# Patient Record
Sex: FEMALE | Race: WHITE | NOT HISPANIC OR LATINO | Employment: PART TIME | URBAN - METROPOLITAN AREA
[De-identification: names, ages, dates, MRNs, and addresses within clinical notes are randomized per-mention and may not be internally consistent; named-entity substitution may affect disease eponyms.]

---

## 2017-05-30 ENCOUNTER — TRANSCRIBE ORDERS (OUTPATIENT)
Dept: ADMINISTRATIVE | Facility: HOSPITAL | Age: 37
End: 2017-05-30

## 2017-05-30 DIAGNOSIS — N83.209 CYST OF OVARY, UNSPECIFIED LATERALITY: Primary | ICD-10-CM

## 2017-06-01 ENCOUNTER — HOSPITAL ENCOUNTER (OUTPATIENT)
Dept: RADIOLOGY | Facility: HOSPITAL | Age: 37
Discharge: HOME | End: 2017-06-01
Attending: OBSTETRICS & GYNECOLOGY
Payer: COMMERCIAL

## 2017-06-01 DIAGNOSIS — N83.209 CYST OF OVARY, UNSPECIFIED LATERALITY: ICD-10-CM

## 2017-06-01 PROCEDURE — 76856 US EXAM PELVIC COMPLETE: CPT

## 2017-06-01 PROCEDURE — 76830 TRANSVAGINAL US NON-OB: CPT

## 2017-07-24 ENCOUNTER — GENERIC CONVERSION - ENCOUNTER (OUTPATIENT)
Dept: OTHER | Facility: OTHER | Age: 37
End: 2017-07-24

## 2018-01-11 NOTE — RESULT NOTES
Message   I discussed results of chest x ray with patient     Verified Results  * XR CHEST PA & LATERAL 22Apr2016 01:23PM Marvin Cross Order Number: [id number]   Performing Comments: Copy to Dr Ibarra Denae Name Result Flag Reference   XR CHEST PA & LATERAL (Report)     CHEST - DUAL ENERGY     INDICATION: Cough  COMPARISON: None     VIEWS: PA (including soft tissue/bone algorithms) and lateral projections; 4 images     FINDINGS:        Cardiomediastinal silhouette appears unremarkable  The lungs are clear  No pneumothorax or pleural effusion  Visualized osseous structures appear within normal limits for the patient's age  IMPRESSION:     No active pulmonary disease            Workstation performed: [id number]     Signed by:   Yahaira Harmon MD   4/22/16       Plan  Bronchitis, chronic obstructive, with exacerbation    · Breo Ellipta 100-25 MCG/INH Inhalation Aerosol Powder Breath Activated;  INHALE 1 PUFFS Daily

## 2018-01-15 NOTE — MISCELLANEOUS
Provider Comments  Provider Comments:   patient did not come in for scheduled appointment  sent no show letter in the mail      Signatures   Electronically signed by :  Pamella Baires, ; Jul 24 2017 11:47AM EST                       (Author)

## 2018-11-14 ENCOUNTER — TRANSCRIBE ORDERS (OUTPATIENT)
Dept: ADMINISTRATIVE | Facility: HOSPITAL | Age: 38
End: 2018-11-14

## 2018-11-14 DIAGNOSIS — R10.32 ABDOMINAL PAIN, LEFT LOWER QUADRANT: Primary | ICD-10-CM

## 2018-11-16 ENCOUNTER — HOSPITAL ENCOUNTER (OUTPATIENT)
Dept: RADIOLOGY | Facility: HOSPITAL | Age: 38
Discharge: HOME | End: 2018-11-16
Attending: OBSTETRICS & GYNECOLOGY
Payer: COMMERCIAL

## 2018-11-16 DIAGNOSIS — R10.32 ABDOMINAL PAIN, LEFT LOWER QUADRANT: ICD-10-CM

## 2018-11-16 PROCEDURE — 76856 US EXAM PELVIC COMPLETE: CPT

## 2018-11-16 PROCEDURE — 76830 TRANSVAGINAL US NON-OB: CPT

## 2021-03-27 ENCOUNTER — HOSPITAL ENCOUNTER (EMERGENCY)
Facility: HOSPITAL | Age: 41
Discharge: HOME | End: 2021-03-27
Attending: EMERGENCY MEDICINE
Payer: COMMERCIAL

## 2021-03-27 ENCOUNTER — APPOINTMENT (EMERGENCY)
Dept: RADIOLOGY | Facility: HOSPITAL | Age: 41
End: 2021-03-27
Payer: COMMERCIAL

## 2021-03-27 VITALS
RESPIRATION RATE: 16 BRPM | DIASTOLIC BLOOD PRESSURE: 83 MMHG | BODY MASS INDEX: 34.79 KG/M2 | OXYGEN SATURATION: 100 % | HEART RATE: 76 BPM | WEIGHT: 190.2 LBS | TEMPERATURE: 98.7 F | SYSTOLIC BLOOD PRESSURE: 148 MMHG

## 2021-03-27 DIAGNOSIS — D64.9 ANEMIA: ICD-10-CM

## 2021-03-27 DIAGNOSIS — R51.9 HEADACHE: ICD-10-CM

## 2021-03-27 DIAGNOSIS — R20.2 PARESTHESIAS: Primary | ICD-10-CM

## 2021-03-27 DIAGNOSIS — R91.8 PULMONARY NODULES: ICD-10-CM

## 2021-03-27 LAB
ALBUMIN SERPL BCP-MCNC: 3.8 G/DL (ref 3.5–5)
ALP SERPL-CCNC: 57 U/L (ref 46–116)
ALT SERPL W P-5'-P-CCNC: 26 U/L (ref 12–78)
AMPHETAMINES SERPL QL SCN: NEGATIVE
ANION GAP SERPL CALCULATED.3IONS-SCNC: 10 MMOL/L (ref 4–13)
APTT PPP: 31 SECONDS (ref 23–37)
AST SERPL W P-5'-P-CCNC: 15 U/L (ref 5–45)
BACTERIA UR QL AUTO: NORMAL /HPF
BARBITURATES UR QL: NEGATIVE
BASOPHILS # BLD AUTO: 0.06 THOUSANDS/ΜL (ref 0–0.1)
BASOPHILS NFR BLD AUTO: 1 % (ref 0–1)
BENZODIAZ UR QL: NEGATIVE
BILIRUB SERPL-MCNC: 0.2 MG/DL (ref 0.2–1)
BILIRUB UR QL STRIP: NEGATIVE
BUN SERPL-MCNC: 5 MG/DL (ref 5–25)
CALCIUM SERPL-MCNC: 8.7 MG/DL (ref 8.3–10.1)
CHLORIDE SERPL-SCNC: 104 MMOL/L (ref 100–108)
CLARITY UR: CLEAR
CO2 SERPL-SCNC: 25 MMOL/L (ref 21–32)
COCAINE UR QL: NEGATIVE
COLOR UR: ABNORMAL
CREAT SERPL-MCNC: 0.68 MG/DL (ref 0.6–1.3)
EOSINOPHIL # BLD AUTO: 0.18 THOUSAND/ΜL (ref 0–0.61)
EOSINOPHIL NFR BLD AUTO: 2 % (ref 0–6)
ERYTHROCYTE [DISTWIDTH] IN BLOOD BY AUTOMATED COUNT: 11.9 % (ref 11.6–15.1)
EXT PREG TEST URINE: NORMAL
EXT. CONTROL ED NAV: NORMAL
GFR SERPL CREATININE-BSD FRML MDRD: 109 ML/MIN/1.73SQ M
GLUCOSE SERPL-MCNC: 94 MG/DL (ref 65–140)
GLUCOSE UR STRIP-MCNC: NEGATIVE MG/DL
HCT VFR BLD AUTO: 34.3 % (ref 34.8–46.1)
HGB BLD-MCNC: 10.9 G/DL (ref 11.5–15.4)
HGB UR QL STRIP.AUTO: ABNORMAL
IMM GRANULOCYTES # BLD AUTO: 0.02 THOUSAND/UL (ref 0–0.2)
IMM GRANULOCYTES NFR BLD AUTO: 0 % (ref 0–2)
INR PPP: 0.95 (ref 0.84–1.19)
KETONES UR STRIP-MCNC: NEGATIVE MG/DL
LEUKOCYTE ESTERASE UR QL STRIP: NEGATIVE
LYMPHOCYTES # BLD AUTO: 1.55 THOUSANDS/ΜL (ref 0.6–4.47)
LYMPHOCYTES NFR BLD AUTO: 21 % (ref 14–44)
MAGNESIUM SERPL-MCNC: 2.3 MG/DL (ref 1.6–2.6)
MCH RBC QN AUTO: 30.8 PG (ref 26.8–34.3)
MCHC RBC AUTO-ENTMCNC: 31.8 G/DL (ref 31.4–37.4)
MCV RBC AUTO: 97 FL (ref 82–98)
METHADONE UR QL: NEGATIVE
MONOCYTES # BLD AUTO: 0.39 THOUSAND/ΜL (ref 0.17–1.22)
MONOCYTES NFR BLD AUTO: 5 % (ref 4–12)
NEUTROPHILS # BLD AUTO: 5.28 THOUSANDS/ΜL (ref 1.85–7.62)
NEUTS SEG NFR BLD AUTO: 71 % (ref 43–75)
NITRITE UR QL STRIP: NEGATIVE
NON-SQ EPI CELLS URNS QL MICRO: NORMAL /HPF
NRBC BLD AUTO-RTO: 0 /100 WBCS
OPIATES UR QL SCN: NEGATIVE
OXYCODONE+OXYMORPHONE UR QL SCN: NEGATIVE
PCP UR QL: NEGATIVE
PH UR STRIP.AUTO: 7.5 [PH]
PLATELET # BLD AUTO: 297 THOUSANDS/UL (ref 149–390)
PMV BLD AUTO: 8.9 FL (ref 8.9–12.7)
POTASSIUM SERPL-SCNC: 3.9 MMOL/L (ref 3.5–5.3)
PROT SERPL-MCNC: 7.9 G/DL (ref 6.4–8.2)
PROT UR STRIP-MCNC: NEGATIVE MG/DL
PROTHROMBIN TIME: 12.6 SECONDS (ref 11.6–14.5)
RBC # BLD AUTO: 3.54 MILLION/UL (ref 3.81–5.12)
RBC #/AREA URNS AUTO: NORMAL /HPF
SODIUM SERPL-SCNC: 139 MMOL/L (ref 136–145)
SP GR UR STRIP.AUTO: 1.01 (ref 1–1.03)
THC UR QL: NEGATIVE
UROBILINOGEN UR QL STRIP.AUTO: 0.2 E.U./DL
WBC # BLD AUTO: 7.48 THOUSAND/UL (ref 4.31–10.16)
WBC #/AREA URNS AUTO: NORMAL /HPF

## 2021-03-27 PROCEDURE — 99284 EMERGENCY DEPT VISIT MOD MDM: CPT

## 2021-03-27 PROCEDURE — 96374 THER/PROPH/DIAG INJ IV PUSH: CPT

## 2021-03-27 PROCEDURE — 85025 COMPLETE CBC W/AUTO DIFF WBC: CPT | Performed by: EMERGENCY MEDICINE

## 2021-03-27 PROCEDURE — 36415 COLL VENOUS BLD VENIPUNCTURE: CPT | Performed by: EMERGENCY MEDICINE

## 2021-03-27 PROCEDURE — 81001 URINALYSIS AUTO W/SCOPE: CPT | Performed by: EMERGENCY MEDICINE

## 2021-03-27 PROCEDURE — 85730 THROMBOPLASTIN TIME PARTIAL: CPT | Performed by: EMERGENCY MEDICINE

## 2021-03-27 PROCEDURE — 85610 PROTHROMBIN TIME: CPT | Performed by: EMERGENCY MEDICINE

## 2021-03-27 PROCEDURE — 70496 CT ANGIOGRAPHY HEAD: CPT

## 2021-03-27 PROCEDURE — G1004 CDSM NDSC: HCPCS

## 2021-03-27 PROCEDURE — 80307 DRUG TEST PRSMV CHEM ANLYZR: CPT | Performed by: EMERGENCY MEDICINE

## 2021-03-27 PROCEDURE — 70498 CT ANGIOGRAPHY NECK: CPT

## 2021-03-27 PROCEDURE — 81025 URINE PREGNANCY TEST: CPT | Performed by: EMERGENCY MEDICINE

## 2021-03-27 PROCEDURE — 80053 COMPREHEN METABOLIC PANEL: CPT | Performed by: EMERGENCY MEDICINE

## 2021-03-27 PROCEDURE — 99284 EMERGENCY DEPT VISIT MOD MDM: CPT | Performed by: EMERGENCY MEDICINE

## 2021-03-27 PROCEDURE — 83735 ASSAY OF MAGNESIUM: CPT | Performed by: EMERGENCY MEDICINE

## 2021-03-27 RX ORDER — MEGESTROL ACETATE 40 MG/1
20 TABLET ORAL
COMMUNITY

## 2021-03-27 RX ORDER — KETOROLAC TROMETHAMINE 30 MG/ML
15 INJECTION, SOLUTION INTRAMUSCULAR; INTRAVENOUS ONCE
Status: COMPLETED | OUTPATIENT
Start: 2021-03-27 | End: 2021-03-27

## 2021-03-27 RX ORDER — FERROUS SULFATE 325(65) MG
325 TABLET ORAL DAILY
Qty: 30 TABLET | Refills: 0 | Status: SHIPPED | OUTPATIENT
Start: 2021-03-27 | End: 2021-04-20

## 2021-03-27 RX ORDER — DOCUSATE SODIUM 100 MG/1
100 CAPSULE, LIQUID FILLED ORAL 2 TIMES DAILY
Qty: 10 CAPSULE | Refills: 0 | Status: SHIPPED | OUTPATIENT
Start: 2021-03-27 | End: 2021-04-20

## 2021-03-27 RX ADMIN — KETOROLAC TROMETHAMINE 15 MG: 30 INJECTION, SOLUTION INTRAMUSCULAR at 14:40

## 2021-03-27 RX ADMIN — IOHEXOL 85 ML: 350 INJECTION, SOLUTION INTRAVENOUS at 13:10

## 2021-03-27 NOTE — ED PROVIDER NOTES
History  Chief Complaint   Patient presents with    Numbness     States yesterday she was lying on bed because she had a headache left side of head  States her left foot, left hand , left side of tongue were numb for about 10 minutes  Also, a black hole in her vision same time  Occured at 4 pm yesterday  No headache at present  Pt in the ER with c/o left sided paresthesias that began yesterday at approx 4p, lasted for 10mins and resolved spontaneously  Symptoms are also associated with a headache, which persists  Headache was of gradual onset and non radiating, not associated with n/v/photophobia  Pt also complained of vision changes during the episode  All neurological symptoms, except for the headache have since resolved  Pt hasn't taken any meds for pain relief  Pt has a hx of menorrhagia, with menses for approx 1mth, and she was recently started on Megace  She denies recent illness  History provided by:  Patient   used: No        Prior to Admission Medications   Prescriptions Last Dose Informant Patient Reported? Taking?    albuterol (PROVENTIL HFA,VENTOLIN HFA) 90 mcg/act inhaler Unknown at Unknown time  Yes No   Sig: Inhale 2 puffs every 6 (six) hours as needed for wheezing   megestrol (MEGACE) 40 mg tablet 3/26/2021 at Unknown time Self Yes Yes   Sig: Take 40 mg by mouth 2 (two) times a day States started on 3/24   sertraline (ZOLOFT) 50 mg tablet   Yes No   Sig: Take 50 mg by mouth daily      Facility-Administered Medications: None       Past Medical History:   Diagnosis Date    Ankle fracture, right     2016  - no surgery    Bronchitis     past 6 mos    Depression     Hypercholesteremia     Irregular menstrual bleeding        Past Surgical History:   Procedure Laterality Date     SECTION      LAPAROTOMY Right 10/10/2016    Procedure: LAPAROTOMY EXPLORATORY,  OVARIAN CYSTECTOMY, OOPHORECTOMY;  Surgeon: Grey Way MD;  Location: [address] [place] [address];  Service:    Parul Alba TUBAL LIGATION      WISDOM TOOTH EXTRACTION         History reviewed  No pertinent family history  I have reviewed and agree with the history as documented  E-Cigarette/Vaping    E-Cigarette Use Never User      E-Cigarette/Vaping Substances     Social History     Tobacco Use    Smoking status: Current Every Day Smoker     Packs/day: 1 00     Years: 20 00     Pack years: 20 00     Types: Cigarettes    Smokeless tobacco: Never Used   Substance Use Topics    Alcohol use: No    Drug use: No       Review of Systems   Constitutional: Negative for chills and fever  Respiratory: Negative for cough, chest tightness and shortness of breath  Gastrointestinal: Negative for abdominal pain, diarrhea, nausea and vomiting  Genitourinary: Negative for dysuria, frequency, hematuria and urgency  Musculoskeletal: Negative for back pain, neck pain and neck stiffness  Skin: Negative for color change, pallor, rash and wound  Neurological: Positive for headaches  Negative for seizures, weakness and light-headedness  Psychiatric/Behavioral: Negative for agitation and confusion  All other systems reviewed and are negative  Physical Exam  Physical Exam  Vitals signs and nursing note reviewed  Constitutional:       General: She is not in acute distress  Appearance: She is well-developed  She is not diaphoretic  HENT:      Head: Normocephalic and atraumatic  Eyes:      Conjunctiva/sclera: Conjunctivae normal       Pupils: Pupils are equal, round, and reactive to light  Neck:      Musculoskeletal: Normal range of motion and neck supple  Cardiovascular:      Rate and Rhythm: Normal rate and regular rhythm  Heart sounds: Normal heart sounds  No murmur  Pulmonary:      Effort: Pulmonary effort is normal  No respiratory distress  Breath sounds: Normal breath sounds  Abdominal:      General: Bowel sounds are normal  There is no distension  Palpations: Abdomen is soft        Tenderness: There is no abdominal tenderness  Musculoskeletal: Normal range of motion  General: No deformity  Skin:     General: Skin is warm and dry  Capillary Refill: Capillary refill takes less than 2 seconds  Coloration: Skin is not pale  Findings: No rash  Neurological:      General: No focal deficit present  Mental Status: She is alert and oriented to person, place, and time  Cranial Nerves: No cranial nerve deficit     Psychiatric:         Behavior: Behavior normal          Vital Signs  ED Triage Vitals   Temperature Pulse Respirations Blood Pressure SpO2   03/27/21 1112 03/27/21 1112 03/27/21 1112 03/27/21 1112 03/27/21 1112   98 7 °F (37 1 °C) 76 16 148/83 100 %      Temp Source Heart Rate Source Patient Position - Orthostatic VS BP Location FiO2 (%)   03/27/21 1112 03/27/21 1112 03/27/21 1112 03/27/21 1112 --   Tympanic Monitor Sitting Left arm       Pain Score       03/27/21 1440       6           Vitals:    03/27/21 1112   BP: 148/83   Pulse: 76   Patient Position - Orthostatic VS: Sitting         Visual Acuity      ED Medications  Medications   iohexol (OMNIPAQUE) 350 MG/ML injection (SINGLE-DOSE) 85 mL (85 mL Intravenous Given 3/27/21 1310)   ketorolac (TORADOL) injection 15 mg (15 mg Intravenous Given 3/27/21 1440)       Diagnostic Studies  Results Reviewed     Procedure Component Value Units Date/Time    POCT pregnancy, urine [[id number]]  (Normal) Resulted: 03/27/21 1221    Lab Status: Final result Updated: 03/27/21 1221     EXT PREG TEST UR (Ref: Negative) neg     Control valid    Comprehensive metabolic panel [[id number]] Collected: 03/27/21 1145    Lab Status: Final result Specimen: Blood from Arm, Left Updated: 03/27/21 1215     Sodium 139 mmol/L      Potassium 3 9 mmol/L      Chloride 104 mmol/L      CO2 25 mmol/L      ANION GAP 10 mmol/L      BUN 5 mg/dL      Creatinine 0 68 mg/dL      Glucose 94 mg/dL      Calcium 8 7 mg/dL      AST 15 U/L      ALT 26 U/L      Alkaline Phosphatase 57 U/L      Total Protein 7 9 g/dL      Albumin 3 8 g/dL      Total Bilirubin 0 20 mg/dL      eGFR 109 ml/min/1 73sq m     Narrative:      [place] guidelines for Chronic Kidney Disease (CKD):     Stage 1 with normal or high GFR (GFR > 90 mL/min/1 73 square meters)    Stage 2 Mild CKD (GFR = 60-89 mL/min/1 73 square meters)    Stage 3A Moderate CKD (GFR = 45-59 mL/min/1 73 square meters)    Stage 3B Moderate CKD (GFR = 30-44 mL/min/1 73 square meters)    Stage 4 Severe CKD (GFR = 15-29 mL/min/1 73 square meters)    Stage 5 End Stage CKD (GFR <15 mL/min/1 73 square meters)  Note: GFR calculation is accurate only with a steady state creatinine    Magnesium [[id number]]  (Normal) Collected: 03/27/21 1145    Lab Status: Final result Specimen: Blood from Arm, Left Updated: 03/27/21 1215     Magnesium 2 3 mg/dL     Urine Microscopic [[id number]]  (Normal) Collected: 03/27/21 1145    Lab Status: Final result Specimen: Urine, Clean Catch Updated: 03/27/21 1215     RBC, UA None Seen /hpf      WBC, UA 0-1 /hpf      Epithelial Cells Occasional /hpf      Bacteria, UA None Seen /hpf     Rapid drug screen, urine [[id number]]  (Normal) Collected: 03/27/21 1145    Lab Status: Final result Specimen: Urine, Clean Catch Updated: 03/27/21 1212     Amph/Meth UR Negative     Barbiturate Ur Negative     Benzodiazepine Urine Negative     Cocaine Urine Negative     Methadone Urine Negative     Opiate Urine Negative     PCP Ur Negative     THC Urine Negative     Oxycodone Urine Negative    Narrative:      FOR MEDICAL PURPOSES ONLY  IF CONFIRMATION NEEDED PLEASE CONTACT THE LAB WITHIN 5 DAYS      Drug Screen Cutoff Levels:  AMPHETAMINE/METHAMPHETAMINES  1000 ng/mL  BARBITURATES     200 ng/mL  BENZODIAZEPINES     200 ng/mL  COCAINE      300 ng/mL  METHADONE      300 ng/mL  OPIATES      300 ng/mL  PHENCYCLIDINE     25 ng/mL  THC       50 ng/mL  OXYCODONE      100 ng/mL    Protime-INR [[id number]] (Normal) Collected: 03/27/21 1145    Lab Status: Final result Specimen: Blood from Arm, Left Updated: 03/27/21 1210     Protime 12 6 seconds      INR 0 95    APTT [[id number]]  (Normal) Collected: 03/27/21 1145    Lab Status: Final result Specimen: Blood from Arm, Left Updated: 03/27/21 1210     PTT 31 seconds     UA (URINE) with reflex to Scope [[id number]]  (Abnormal) Collected: 03/27/21 1145    Lab Status: Final result Specimen: Urine, Clean Catch Updated: 03/27/21 1157     Color, UA Light Yellow     Clarity, UA Clear     Specific Gravity, UA 1 010     pH, UA 7 5     Leukocytes, UA Negative     Nitrite, UA Negative     Protein, UA Negative mg/dl      Glucose, UA Negative mg/dl      Ketones, UA Negative mg/dl      Urobilinogen, UA 0 2 E U /dl      Bilirubin, UA Negative     Blood, UA Trace-Intact    CBC and differential [[id number]]  (Abnormal) Collected: 03/27/21 1145    Lab Status: Final result Specimen: Blood from Arm, Left Updated: 03/27/21 1154     WBC 7 48 Thousand/uL      RBC 3 54 Million/uL      Hemoglobin 10 9 g/dL      Hematocrit 34 3 %      MCV 97 fL      MCH 30 8 pg      MCHC 31 8 g/dL      RDW 11 9 %      MPV 8 9 fL      Platelets 827 Thousands/uL      nRBC 0 /100 WBCs      Neutrophils Relative 71 %      Immat GRANS % 0 %      Lymphocytes Relative 21 %      Monocytes Relative 5 %      Eosinophils Relative 2 %      Basophils Relative 1 %      Neutrophils Absolute 5 28 Thousands/µL      Immature Grans Absolute 0 02 Thousand/uL      Lymphocytes Absolute 1 55 Thousands/µL      Monocytes Absolute 0 39 Thousand/µL      Eosinophils Absolute 0 18 Thousand/µL      Basophils Absolute 0 06 Thousands/µL                  CTA head and neck with and without contrast   Final Result by Bozena Hendrix MD (03/27 5990)         1  No evidence of acute vascular territorial infarction, intracranial hemorrhage or mass effect     2   No stenosis, dissection or occlusion of the carotid or vertebral arteries or major vessels of the [place]  3   Pulmonary nodules in the partially visualized upper lungs measuring up to 7 mm  Based on current Fleischner Society 2017 Guidelines on incidental pulmonary nodule, followup non-contrast CT is recommended at 6-12 months from the initial examination    and, if stable at that time, an additional followup is recommended for 18-24 months from the initial examination  Workstation performed: [id number]                    Procedures  Procedures         ED Course                             SBIRT 20yo+      Most Recent Value   SBIRT (22 yo +)   In order to provide better care to our patients, we are screening all of our patients for alcohol and drug use  Would it be okay to ask you these screening questions? Yes Filed at: 03/27/2021 1256   Initial Alcohol Screen: US AUDIT-C    1  How often do you have a drink containing alcohol? 2 Filed at: 03/27/2021 1256   2  How many drinks containing alcohol do you have on a typical day you are drinking? 1 Filed at: 03/27/2021 1256   3a  Male UNDER 65: How often do you have five or more drinks on one occasion? 0 Filed at: 03/27/2021 1256   3b  FEMALE Any Age, or MALE 65+: How often do you have 4 or more drinks on one occassion? 0 Filed at: 03/27/2021 1256   Audit-C Score  3 Filed at: 03/27/2021 1256   FADY: How many times in the past year have you    Used an illegal drug or used a prescription medication for non-medical reasons? Never Filed at: 03/27/2021 1256                    MDM  Number of Diagnoses or Management Options  Anemia: new and requires workup  Headache: new and requires workup  Paresthesias: new and requires workup  Pulmonary nodules: new and requires workup  Diagnosis management comments: Pt in the ER with c/o paresthesias, now resolved and a headache  Labs reviewed - +anemia, likely secondary to menorrhagia  CTA reviewed  Neg for acute intracranial pathology  +pulmonary nodules, which I reviewed with patient  Headache resolved with Toradol  Pt will f/u with PCP/Pulm/Neurology  She will return to the ER for further concerns  Amount and/or Complexity of Data Reviewed  Clinical lab tests: ordered and reviewed  Tests in the radiology section of CPT®: ordered and reviewed    Risk of Complications, Morbidity, and/or Mortality  Presenting problems: high  Diagnostic procedures: high  Management options: high    Patient Progress  Patient progress: improved      Disposition  Final diagnoses:   Paresthesias   Pulmonary nodules   Headache   Anemia     Time reflects when diagnosis was documented in both MDM as applicable and the Disposition within this note     Time User Action Codes Description Comment    3/27/2021  2:01 PM Elias Cramp Add [R20 2] Paresthesias     3/27/2021  2:02 PM Elias Cramp O Add [R91 8] Pulmonary nodules     3/27/2021  2:03 PM Elias Cramp Add [R51 9] Headache     3/27/2021  2:08 PM Elias Cramp Add [D64 9] Anemia       ED Disposition     ED Disposition Condition Date/Time Comment    Discharge Stable Sat Mar 27, 2021  2:01 PM Adolfo Mckeon discharge to home/self care              Follow-up Information     Follow up With Specialties Details Why Contact Info Additional Information    Omega Manrique MD Family Medicine Schedule an appointment as soon as possible for a visit in 2 days for follow up [address] [facility] at [address] [facility] [address]  [address] [facility] [address] [place] [address] Ivanna Crook MD Neurology Schedule an appointment as soon as possible for a visit in 2 days for follow up [address] [facility] Pulmonology Schedule an appointment as soon as possible for a visit  for follow up for pulmonary nodules [address] [facility] [address]  [facility] Pulmonary [address] [place], [place], [address] [phone]          Discharge Medication List as of 3/27/2021  2:09 PM      START taking these medications    Details   docusate sodium (COLACE) 100 mg capsule Take 1 capsule (100 mg total) by mouth 2 (two) times a day, Starting Sat 3/27/2021, Normal      ferrous sulfate 325 (65 Fe) mg tablet Take 1 tablet (325 mg total) by mouth daily, Starting Sat 3/27/2021, Normal         CONTINUE these medications which have NOT CHANGED    Details   megestrol (MEGACE) 40 mg tablet Take 40 mg by mouth 2 (two) times a day States started on 3/24, Historical Med      albuterol (PROVENTIL HFA,VENTOLIN HFA) 90 mcg/act inhaler Inhale 2 puffs every 6 (six) hours as needed for wheezing, Until Discontinued, Historical Med      sertraline (ZOLOFT) 50 mg tablet Take 50 mg by mouth daily, Until Discontinued, Historical Med           No discharge procedures on file      PDMP Review     None          ED Provider  Electronically Signed by           Sharon Cowart DO  03/28/21 [id number]

## 2021-03-27 NOTE — DISCHARGE INSTRUCTIONS
Return to the ER for further concerns or worsening symptoms  Follow up with your primary care physician, pulmonology and neurology in 1-2 days  Take tylenol or motrin for pain relief  Take medication as prescribed

## 2021-04-20 ENCOUNTER — OFFICE VISIT (OUTPATIENT)
Dept: PULMONOLOGY | Facility: CLINIC | Age: 41
End: 2021-04-20
Payer: COMMERCIAL

## 2021-04-20 VITALS
SYSTOLIC BLOOD PRESSURE: 140 MMHG | DIASTOLIC BLOOD PRESSURE: 80 MMHG | OXYGEN SATURATION: 99 % | TEMPERATURE: 97.8 F | BODY MASS INDEX: 34.41 KG/M2 | WEIGHT: 187 LBS | HEIGHT: 62 IN | HEART RATE: 85 BPM

## 2021-04-20 DIAGNOSIS — R91.8 PULMONARY NODULES: ICD-10-CM

## 2021-04-20 DIAGNOSIS — Z72.0 TOBACCO USE: ICD-10-CM

## 2021-04-20 DIAGNOSIS — R05.9 COUGH: Primary | ICD-10-CM

## 2021-04-20 PROCEDURE — 99205 OFFICE O/P NEW HI 60 MIN: CPT | Performed by: INTERNAL MEDICINE

## 2021-04-20 PROCEDURE — 99407 BEHAV CHNG SMOKING > 10 MIN: CPT | Performed by: INTERNAL MEDICINE

## 2021-04-20 RX ORDER — FLUTICASONE FUROATE AND VILANTEROL 200; 25 UG/1; UG/1
1 POWDER RESPIRATORY (INHALATION) DAILY
Qty: 1 INHALER | Refills: 5 | Status: SHIPPED | OUTPATIENT
Start: 2021-04-20

## 2021-04-20 RX ORDER — ALBUTEROL SULFATE 90 UG/1
2 AEROSOL, METERED RESPIRATORY (INHALATION) EVERY 6 HOURS PRN
Qty: 18 G | Refills: 5 | Status: SHIPPED | OUTPATIENT
Start: 2021-04-20

## 2021-04-20 RX ORDER — BUPROPION HYDROCHLORIDE 150 MG/1
150 TABLET ORAL DAILY
Qty: 60 TABLET | Refills: 3 | Status: SHIPPED | OUTPATIENT
Start: 2021-04-20

## 2021-04-20 NOTE — ASSESSMENT & PLAN NOTE
Jimmie Mcgee reports constant productive cough, present at all times, but worst in the mornings  This is made worse by change of seasons, and is associated with shortness of breath  There is a reasonable probability of COPD, so I am sending her for pulmonary function tests  Will also start Breo given the contribution of asthmatic like symptoms and the presence of eosinophilia on recent blood work  Rescue inhaler also provided

## 2021-04-20 NOTE — ASSESSMENT & PLAN NOTE
Rocky Barreto is contemplating quitting smoking  She is unwilling to consider Chantix due to negative side effects experienced by her   She was open to Wellbutrin  She is considering nicotine patches as well  Will start Wellbutrin  I spent about 12 minutes covering tobacco cessation with her during our consultation

## 2021-04-20 NOTE — PROGRESS NOTES
Pulmonary Consultation   Danny Meza 38 y o  female MRN: [id number]  4/20/2021      Assessment:    Tobacco use  Tyree Friend is contemplating quitting smoking  She is unwilling to consider Chantix due to negative side effects experienced by her   She was open to Wellbutrin  She is considering nicotine patches as well  Will start Wellbutrin  I spent about 12 minutes covering tobacco cessation with her during our consultation  Pulmonary nodules  There are multiple subcentimeter rounded, noncalcified nodules in no particular geographic distribution throughout her lungs  Some of these have a surrounding halo of ground-glass  The differential for these is broad, including potentially malignancy given her type B symptoms, to smoking related or inflammatory  I anticipate we will likely need to repeat a CT scan in 3 months for progression, but I am sending her outside CT down to our radiology department for evaluation  Depending on suggestions from that department, may alter the timeline discussed  Since the nodules are subcentimeter, they are likely too small to evaluate by PET or biopsy at this point  Cough  Tyree Friend reports constant productive cough, present at all times, but worst in the mornings  This is made worse by change of seasons, and is associated with shortness of breath  There is a reasonable probability of COPD, so I am sending her for pulmonary function tests  Will also start Breo given the contribution of asthmatic like symptoms and the presence of eosinophilia on recent blood work  Rescue inhaler also provided  Plan:    Diagnoses and all orders for this visit:    Cough  -     fluticasone-vilanterol (BREO ELLIPTA) 200-25 MCG/INH inhaler; Inhale 1 puff daily Rinse mouth after use  -     albuterol (Ventolin HFA) 90 mcg/act inhaler; Inhale 2 puffs every 6 (six) hours as needed for wheezing  -     Complete PFT with post bronchodilator;  Future    Tobacco use  -     buPROPion (WELLBUTRIN XL) 150 mg 24 hr tablet; Take 1 tablet (150 mg total) by mouth daily    Pulmonary nodules    No follow-ups on file  Anticipating 3 month follow-up after CT scan; however, this timeline may change pending radiology evaluation  History of Present Illness   HPI:  Atilio Rosenbaum is a 38 y o  female who presents for evaluation of an abnormal CT scan of the chest   The patient reports that she was seen in the emergency room for atypical neurologic symptoms including numbness and tingling of the left side, for which she underwent a CTA of the head and neck  This was unremarkable for vascular insufficiency or infarct, and the patient was preliminarily diagnosed with a TIA  However, the patient also had a 7 mm right upper lobe nodule noted on the CT neck, for which she underwent a dedicated CT chest at an outside medical institution, revealing multiple subcentimeter nodules, with relatively high intensity, attached to vascular structures, some of which included a surrounding ground-glass halo  They were distributed more on the right than on the left, with a slight per dual action for the right lower lobe, but the largest of these nodules is in the right upper lobe  There is no associated pathologically enlarged lymphadenopathy, although multiple station 4R lymph nodes are visible on the CT scan  This CT scan did not come with an associated radiology read  Symptomatically, the patient reports that she has off and on shortness of breath with mild exertion, described as going up a flight of stairs  She has a chronic baseline cough, worse in the mornings, productive of white sputum  This is present throughout the day as well, but less prominent  She reports her symptoms are getting worse over time  She does have a significant smoking history  She reports she was previously on a rescue inhaler which she did field significant benefit from, however she discontinued it and did not follow-up    She also reports that she is undergoing B type symptoms  She thinks that these are attributable to entering menopause  She describes night sweats which were more common a few months earlier, but she has recently been placed on Megace for dysfunctional uterine bleeding, and has noted that the night sweats have more or less abated since then, although she does indicate that she had them the day prior to presentation  She has not unintentionally lost weight  She is not having fevers  Her medical history is otherwise only significant for dysfunctional uterine bleeding  She has had no major surgeries in her past   Socially, she has smoked since age 11, about a pack and half per day  She has significant secondhand smoke exposure through her family  If family history is positive for ovarian cancer in a maternal aunt, and brain cancer in a maternal grandfather  There is no other cancer in the family  Emphysema does run in her family  She is a current everyday drinker of a 6 pack daily  Review of Systems   Constitutional: Positive for diaphoresis  Negative for fever  Respiratory: Positive for cough, shortness of breath and wheezing  Allergic/Immunologic: Positive for environmental allergies  Neurological: Positive for numbness  All other systems reviewed and are negative  Historical Information   Past Medical History:   Diagnosis Date    Ankle fracture, right     2016  - no surgery    Bronchitis     past 6 mos    Depression     Hypercholesteremia     Irregular menstrual bleeding      Past Surgical History:   Procedure Laterality Date     SECTION      LAPAROTOMY Right 10/10/2016    Procedure: LAPAROTOMY EXPLORATORY,  OVARIAN CYSTECTOMY, OOPHORECTOMY;  Surgeon: Arlys Cogan, MD;  Location: [address] [place] [address];  Service:    Paula Finn TUBAL LIGATION      WISDOM TOOTH EXTRACTION       History reviewed  No pertinent family history        Meds/Allergies     Current Outpatient Medications:     megestrol (MEGACE) 40 mg tablet, Take 20 mg by mouth States started on 3/24  Pt states today that she is taking once daily at bedtime 4/20/21, Disp: , Rfl:     albuterol (PROVENTIL HFA,VENTOLIN HFA) 90 mcg/act inhaler, Inhale 2 puffs every 6 (six) hours as needed for wheezing, Disp: , Rfl:     albuterol (Ventolin HFA) 90 mcg/act inhaler, Inhale 2 puffs every 6 (six) hours as needed for wheezing, Disp: 18 g, Rfl: 5    buPROPion (WELLBUTRIN XL) 150 mg 24 hr tablet, Take 1 tablet (150 mg total) by mouth daily, Disp: 60 tablet, Rfl: 3    fluticasone-vilanterol (BREO ELLIPTA) 200-25 MCG/INH inhaler, Inhale 1 puff daily Rinse mouth after use , Disp: 1 Inhaler, Rfl: 5    sertraline (ZOLOFT) 50 mg tablet, Take 50 mg by mouth daily, Disp: , Rfl:   No Known Allergies    Vitals: Blood pressure 140/80, pulse 85, temperature 97 8 °F (36 6 °C), temperature source Temporal, height 5' 1 5" (1 562 m), weight 84 8 kg (187 lb), SpO2 99 %, not currently breastfeeding  Body mass index is 34 76 kg/m²  Oxygen Therapy  SpO2: 99 %  Oxygen Therapy: None (Room air)      Physical Exam  Physical Exam  Vitals signs reviewed  Constitutional:       General: She is not in acute distress  Appearance: Normal appearance  She is well-developed  She is not ill-appearing  HENT:      Head: Normocephalic and atraumatic  Eyes:      General: No scleral icterus  Conjunctiva/sclera: Conjunctivae normal    Neck:      Musculoskeletal: Neck supple  Vascular: No JVD  Cardiovascular:      Rate and Rhythm: Normal rate and regular rhythm  Heart sounds: Normal heart sounds  No murmur  No friction rub  No gallop  Pulmonary:      Effort: Pulmonary effort is normal  No respiratory distress  Breath sounds: Normal breath sounds  No wheezing or rales  Musculoskeletal:      Right lower leg: No edema  Left lower leg: No edema  Skin:     General: Skin is warm and dry  Findings: No rash     Neurological:      General: No focal deficit present  Mental Status: She is alert and oriented to person, place, and time  Mental status is at baseline  Psychiatric:         Mood and Affect: Mood normal          Behavior: Behavior normal        Labs: I have personally reviewed pertinent lab results  Lab Results   Component Value Date    WBC 7 48 03/27/2021    HGB 10 9 (L) 03/27/2021    HCT 34 3 (L) 03/27/2021    MCV 97 03/27/2021     03/27/2021     Lab Results   Component Value Date    CALCIUM 8 7 03/27/2021    K 3 9 03/27/2021    CO2 25 03/27/2021     03/27/2021    BUN 5 03/27/2021    CREATININE 0 68 03/27/2021     No results found for: IGE  Lab Results   Component Value Date    ALT 26 03/27/2021    AST 15 03/27/2021    ALKPHOS 57 03/27/2021     No major abnormalities noted  Anemia attributed to dysfunctional uterine bleeding  Imaging and other studies: I have personally reviewed pertinent films in PACS  My CT interpretation as above  Pending formal Radiology read  Pulmonary function testing:   Pending  EKG, Pathology, and Other Studies: I have personally reviewed pertinent reports  [facility]

## 2021-04-20 NOTE — ASSESSMENT & PLAN NOTE
There are multiple subcentimeter rounded, noncalcified nodules in no particular geographic distribution throughout her lungs  Some of these have a surrounding halo of ground-glass  The differential for these is broad, including potentially malignancy given her type B symptoms, to smoking related or inflammatory  I anticipate we will likely need to repeat a CT scan in 3 months for progression, but I am sending her outside CT down to our radiology department for evaluation  Depending on suggestions from that department, may alter the timeline discussed  Since the nodules are subcentimeter, they are likely too small to evaluate by PET or biopsy at this point

## 2021-04-23 ENCOUNTER — TELEPHONE (OUTPATIENT)
Dept: PULMONOLOGY | Facility: HOSPITAL | Age: 41
End: 2021-04-23

## 2021-04-23 NOTE — TELEPHONE ENCOUNTER
----- Message from Zaynab Rolon MD sent at 4/23/2021 11:01 AM EDT -----  Bucky Packer - I had our radiologists review Olivia's scan we uploaded earlier in the week  Recommendation was a 3 month follow up and I placed the order  Can you call her and get her scheduled and let her know? Thanks!   Thiago Lucero

## 2021-04-26 NOTE — TELEPHONE ENCOUNTER
Informed pt to have CT chest scheduled for July  Pt stated will call central scheduling to have scheduled

## 2021-05-13 ENCOUNTER — HOSPITAL ENCOUNTER (OUTPATIENT)
Dept: PULMONOLOGY | Facility: HOSPITAL | Age: 41
Discharge: HOME | End: 2021-05-13
Attending: INTERNAL MEDICINE
Payer: COMMERCIAL

## 2021-05-13 DIAGNOSIS — R05.9 COUGH: ICD-10-CM

## 2021-05-13 PROCEDURE — 94726 PLETHYSMOGRAPHY LUNG VOLUMES: CPT | Performed by: INTERNAL MEDICINE

## 2021-05-13 PROCEDURE — 94760 N-INVAS EAR/PLS OXIMETRY 1: CPT

## 2021-05-13 PROCEDURE — 94060 EVALUATION OF WHEEZING: CPT

## 2021-05-13 PROCEDURE — 94726 PLETHYSMOGRAPHY LUNG VOLUMES: CPT

## 2021-05-13 PROCEDURE — 94060 EVALUATION OF WHEEZING: CPT | Performed by: INTERNAL MEDICINE

## 2021-05-13 PROCEDURE — 94729 DIFFUSING CAPACITY: CPT | Performed by: INTERNAL MEDICINE

## 2021-05-13 PROCEDURE — 94729 DIFFUSING CAPACITY: CPT

## 2021-05-13 RX ORDER — ALBUTEROL SULFATE 2.5 MG/3ML
2.5 SOLUTION RESPIRATORY (INHALATION) ONCE
Status: COMPLETED | OUTPATIENT
Start: 2021-05-13 | End: 2021-05-13

## 2021-05-13 RX ADMIN — ALBUTEROL SULFATE 2.5 MG: 2.5 SOLUTION RESPIRATORY (INHALATION) at 09:01

## 2021-05-19 ENCOUNTER — TELEPHONE (OUTPATIENT)
Dept: PULMONOLOGY | Facility: CLINIC | Age: 41
End: 2021-05-19

## 2021-07-02 ENCOUNTER — OCCMED (OUTPATIENT)
Dept: URGENT CARE | Facility: CLINIC | Age: 41
End: 2021-07-02

## 2021-07-02 ENCOUNTER — APPOINTMENT (OUTPATIENT)
Dept: RADIOLOGY | Facility: CLINIC | Age: 41
End: 2021-07-02
Payer: COMMERCIAL

## 2021-07-02 DIAGNOSIS — S99.922A TOE INJURY, LEFT, INITIAL ENCOUNTER: Primary | ICD-10-CM

## 2021-07-02 DIAGNOSIS — S99.922A TOE INJURY, LEFT, INITIAL ENCOUNTER: ICD-10-CM

## 2021-07-02 PROCEDURE — 73660 X-RAY EXAM OF TOE(S): CPT

## 2021-07-28 ENCOUNTER — HOSPITAL ENCOUNTER (OUTPATIENT)
Dept: RADIOLOGY | Facility: HOSPITAL | Age: 41
Discharge: HOME | End: 2021-07-28
Attending: INTERNAL MEDICINE
Payer: COMMERCIAL

## 2021-07-28 DIAGNOSIS — R91.8 PULMONARY NODULES: ICD-10-CM

## 2021-07-28 PROCEDURE — 71250 CT THORAX DX C-: CPT

## 2021-08-02 ENCOUNTER — TELEPHONE (OUTPATIENT)
Dept: PULMONOLOGY | Facility: CLINIC | Age: 41
End: 2021-08-02

## 2021-08-02 DIAGNOSIS — R91.8 PULMONARY NODULES: Primary | ICD-10-CM

## 2021-08-02 NOTE — TELEPHONE ENCOUNTER
Called and gave results of chest CT  No formal recommendations made but I would like to repeat in 3-4 months and see her in office shortly after  Order placed  She is still pre-contemplative with respect to quitting

## 2021-12-10 ENCOUNTER — HOSPITAL ENCOUNTER (OUTPATIENT)
Dept: RADIOLOGY | Facility: HOSPITAL | Age: 41
Discharge: HOME | End: 2021-12-10
Attending: INTERNAL MEDICINE
Payer: COMMERCIAL

## 2021-12-10 DIAGNOSIS — R91.8 PULMONARY NODULES: ICD-10-CM

## 2021-12-10 PROCEDURE — 71250 CT THORAX DX C-: CPT

## 2021-12-10 PROCEDURE — G1004 CDSM NDSC: HCPCS

## 2021-12-20 ENCOUNTER — TELEPHONE (OUTPATIENT)
Dept: PULMONOLOGY | Facility: CLINIC | Age: 41
End: 2021-12-20

## 2022-04-22 ENCOUNTER — OFFICE VISIT (OUTPATIENT)
Dept: URGENT CARE | Facility: CLINIC | Age: 42
End: 2022-04-22
Payer: COMMERCIAL

## 2022-04-22 VITALS
TEMPERATURE: 99.1 F | DIASTOLIC BLOOD PRESSURE: 82 MMHG | RESPIRATION RATE: 18 BRPM | HEIGHT: 61 IN | OXYGEN SATURATION: 99 % | SYSTOLIC BLOOD PRESSURE: 137 MMHG | WEIGHT: 171 LBS | BODY MASS INDEX: 32.28 KG/M2 | HEART RATE: 93 BPM

## 2022-04-22 DIAGNOSIS — B30.9 ACUTE VIRAL CONJUNCTIVITIS OF LEFT EYE: ICD-10-CM

## 2022-04-22 DIAGNOSIS — J06.9 VIRAL URI WITH COUGH: Primary | ICD-10-CM

## 2022-04-22 PROCEDURE — 99213 OFFICE O/P EST LOW 20 MIN: CPT | Performed by: PHYSICIAN ASSISTANT

## 2022-04-22 PROCEDURE — 87636 SARSCOV2 & INF A&B AMP PRB: CPT | Performed by: PHYSICIAN ASSISTANT

## 2022-04-22 RX ORDER — KETOTIFEN FUMARATE 0.35 MG/ML
1 SOLUTION/ DROPS OPHTHALMIC 2 TIMES DAILY
Qty: 5 ML | Refills: 0 | Status: SHIPPED | OUTPATIENT
Start: 2022-04-22

## 2022-04-22 NOTE — LETTER
[facility] Mili Solorzano  [address] [address]  [phone]  Dept: [phone]    April 22, 2022    Patient: Jamaal Hdz  YOB: 1980    Jamaal Hdz was seen and evaluated at our [facility]  Please note if Covid and Flu tests are negative, they may return to work when fever free for 24 hours without the use of a fever reducing agent  If Covid or Flu test is positive, they may return to work on 04/25/2022, as this is 5 days from the onset of symptoms  Upon return, they must then adhere to strict masking for an additional 5 days      Sincerely,    Emilia Segura PA-C

## 2022-04-22 NOTE — PATIENT INSTRUCTIONS
Use eyedrops as prescribed  COVID/Flu swab performed in office, results to be in and 1-3 days, quarantine until results are in, isolation requirements provided  Continue over-the-counter ibuprofen/ Tylenol as needed for symptoms  Adequate rest and fluid hydration are recommended  Follow-up PCP  Return or be seen in ER with any progressing worsening symptoms  Patient understands and is agreeable with this plan

## 2022-04-22 NOTE — PROGRESS NOTES
[facility]        NAME: Blane Mcneill is a 42 y o  female  : 1980    MRN: [id number]  DATE: 2022  TIME: 10:18 AM    Assessment and Plan   Viral URI with cough [J06 9]  1  Viral URI with cough  Covid/Flu-Office Collect   2  Acute viral conjunctivitis of left eye  ketotifen (ZADITOR) 0 025 % ophthalmic solution         Patient Instructions     Patient Instructions   Use eyedrops as prescribed  COVID/Flu swab performed in office, results to be in and 1-3 days, quarantine until results are in, isolation requirements provided  Continue over-the-counter ibuprofen/ Tylenol as needed for symptoms  Adequate rest and fluid hydration are recommended  Follow-up PCP  Return or be seen in ER with any progressing worsening symptoms  Patient understands and is agreeable with this plan  Follow up with PCP in 3-5 days  Proceed to  ER if symptoms worsen  Chief Complaint     Chief Complaint   Patient presents with    URI     Pt reports of worsening URI s/s started approx 2 days ago with cough and congestion and headache  History of Present Illness       Patient is a 54-year-old female presenting today with cold symptoms x2 days  Patient notes over the last few days she has been experiencing some nasal congestion, cough and headache, notes upon awakening this morning she was also experiencing some itching and discomfort of her left eye, has not been taking any medications or alleviating factors for her current symptoms  Patient notes that she was recently diagnosed with poison ivy a couple days ago, states that she is on Keflex and using triamcinolone ointment  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 , denies any known sick contacts  Denies fever, chills, blurred vision, vision changes, trouble swallowing, SOB  Review of Systems   Review of Systems   Constitutional: Negative for chills, fatigue and fever  HENT: Positive for congestion, postnasal drip and sore throat  Eyes: Negative for redness and itching  Respiratory: Positive for cough  Negative for chest tightness and shortness of breath  Cardiovascular: Negative for chest pain  Gastrointestinal: Negative for diarrhea, nausea and vomiting  Musculoskeletal: Negative for arthralgias and myalgias  Neurological: Negative for light-headedness and headaches           Current Medications       Current Outpatient Medications:     albuterol (PROVENTIL HFA,VENTOLIN HFA) 90 mcg/act inhaler, Inhale 2 puffs every 6 (six) hours as needed for wheezing, Disp: , Rfl:     albuterol (Ventolin HFA) 90 mcg/act inhaler, Inhale 2 puffs every 6 (six) hours as needed for wheezing, Disp: 18 g, Rfl: 5    buPROPion (WELLBUTRIN XL) 150 mg 24 hr tablet, Take 1 tablet (150 mg total) by mouth daily, Disp: 60 tablet, Rfl: 3    fluticasone-vilanterol (BREO ELLIPTA) 200-25 MCG/INH inhaler, Inhale 1 puff daily Rinse mouth after use , Disp: 1 Inhaler, Rfl: 5    ketotifen (ZADITOR) 0 025 % ophthalmic solution, Administer 1 drop into the left eye 2 (two) times a day, Disp: 5 mL, Rfl: 0    megestrol (MEGACE) 40 mg tablet, Take 20 mg by mouth States started on 3/24  Pt states today that she is taking once daily at bedtime 21, Disp: , Rfl:     sertraline (ZOLOFT) 50 mg tablet, Take 50 mg by mouth daily, Disp: , Rfl:     Current Allergies     Allergies as of 2022    (No Known Allergies)            The following portions of the patient's history were reviewed and updated as appropriate: allergies, current medications, past family history, past medical history, past social history, past surgical history and problem list      Past Medical History:   Diagnosis Date    Ankle fracture, right     2016  - no surgery    Bronchitis     past 6 mos    Depression     Hypercholesteremia     Irregular menstrual bleeding        Past Surgical History:   Procedure Laterality Date     SECTION      LAPAROTOMY Right 10/10/2016    Procedure: LAPAROTOMY EXPLORATORY,  OVARIAN CYSTECTOMY, OOPHORECTOMY;  Surgeon: Bindu Blair MD;  Location: [address] [place] [address];  Service:    Normie Glory TUBAL LIGATION      WISDOM TOOTH EXTRACTION         History reviewed  No pertinent family history  Medications have been verified  Objective   /82   Pulse 93   Temp 99 1 °F (37 3 °C)   Resp 18   Ht 5' 1" (1 549 m)   Wt 77 6 kg (171 lb)   SpO2 99%   BMI 32 31 kg/m²        Physical Exam     Physical Exam  Vitals reviewed  Constitutional:       General: She is not in acute distress  Appearance: Normal appearance  She is not toxic-appearing  HENT:      Head: Normocephalic and atraumatic  Right Ear: Tympanic membrane, ear canal and external ear normal       Left Ear: Tympanic membrane, ear canal and external ear normal       Nose: Congestion present  Mouth/Throat:      Mouth: Mucous membranes are moist       Pharynx: Oropharynx is clear  No oropharyngeal exudate or posterior oropharyngeal erythema  Eyes:      General:         Right eye: No discharge  Left eye: No discharge  Extraocular Movements: Extraocular movements intact  Pupils: Pupils are equal, round, and reactive to light  Comments: No photophobia, vision grossly intact, mild swelling and redness of left conjunctiva and scleral injection, findings consistent with conjunctivitis  Cardiovascular:      Rate and Rhythm: Normal rate and regular rhythm  Pulses: Normal pulses  Heart sounds: Normal heart sounds  Pulmonary:      Effort: Pulmonary effort is normal       Breath sounds: Normal breath sounds  Lymphadenopathy:      Cervical: No cervical adenopathy  Skin:     General: Skin is warm  Capillary Refill: Capillary refill takes less than 2 seconds  Neurological:      General: No focal deficit present  Mental Status: She is alert and oriented to person, place, and time

## 2022-04-23 LAB
FLUAV RNA RESP QL NAA+PROBE: NEGATIVE
FLUBV RNA RESP QL NAA+PROBE: NEGATIVE
SARS-COV-2 RNA RESP QL NAA+PROBE: NEGATIVE

## 2022-10-12 PROBLEM — R05.9 COUGH: Status: RESOLVED | Noted: 2021-04-20 | Resolved: 2022-10-12

## 2025-02-18 ENCOUNTER — HOSPITAL ENCOUNTER (OUTPATIENT)
Facility: HOSPITAL | Age: 45
Setting detail: OUTPATIENT SURGERY
Discharge: HOME | End: 2025-02-20
Attending: STUDENT IN AN ORGANIZED HEALTH CARE EDUCATION/TRAINING PROGRAM | Admitting: SURGERY
Payer: COMMERCIAL

## 2025-02-18 ENCOUNTER — APPOINTMENT (EMERGENCY)
Dept: RADIOLOGY | Facility: HOSPITAL | Age: 45
End: 2025-02-18
Payer: COMMERCIAL

## 2025-02-18 DIAGNOSIS — K35.80 ACUTE APPENDICITIS: Primary | ICD-10-CM

## 2025-02-18 LAB
ALBUMIN SERPL BCG-MCNC: 4.3 G/DL (ref 3.5–5)
ALP SERPL-CCNC: 38 U/L (ref 34–104)
ALT SERPL W P-5'-P-CCNC: 13 U/L (ref 7–52)
ANION GAP SERPL CALCULATED.3IONS-SCNC: 7 MMOL/L (ref 4–13)
AST SERPL W P-5'-P-CCNC: 15 U/L (ref 13–39)
BACTERIA UR QL AUTO: NORMAL /HPF
BASOPHILS # BLD AUTO: 0.03 THOUSANDS/ΜL (ref 0–0.1)
BASOPHILS NFR BLD AUTO: 0 % (ref 0–1)
BILIRUB SERPL-MCNC: 0.4 MG/DL (ref 0.2–1)
BILIRUB UR QL STRIP: NEGATIVE
BUN SERPL-MCNC: 13 MG/DL (ref 5–25)
CALCIUM SERPL-MCNC: 9.7 MG/DL (ref 8.4–10.2)
CHLORIDE SERPL-SCNC: 109 MMOL/L (ref 96–108)
CLARITY UR: CLEAR
CO2 SERPL-SCNC: 24 MMOL/L (ref 21–32)
COLOR UR: YELLOW
CREAT SERPL-MCNC: 0.65 MG/DL (ref 0.6–1.3)
EOSINOPHIL # BLD AUTO: 0.19 THOUSAND/ΜL (ref 0–0.61)
EOSINOPHIL NFR BLD AUTO: 3 % (ref 0–6)
ERYTHROCYTE [DISTWIDTH] IN BLOOD BY AUTOMATED COUNT: 12.7 % (ref 11.6–15.1)
EXT PREGNANCY TEST URINE: NEGATIVE
EXT. CONTROL: NORMAL
GFR SERPL CREATININE-BSD FRML MDRD: 107 ML/MIN/1.73SQ M
GLUCOSE SERPL-MCNC: 103 MG/DL (ref 65–140)
GLUCOSE UR STRIP-MCNC: NEGATIVE MG/DL
HCT VFR BLD AUTO: 47.3 % (ref 34.8–46.1)
HGB BLD-MCNC: 16.3 G/DL (ref 11.5–15.4)
HGB UR QL STRIP.AUTO: ABNORMAL
IMM GRANULOCYTES # BLD AUTO: 0.01 THOUSAND/UL (ref 0–0.2)
IMM GRANULOCYTES NFR BLD AUTO: 0 % (ref 0–2)
KETONES UR STRIP-MCNC: ABNORMAL MG/DL
LEUKOCYTE ESTERASE UR QL STRIP: NEGATIVE
LIPASE SERPL-CCNC: 25 U/L (ref 11–82)
LYMPHOCYTES # BLD AUTO: 2 THOUSANDS/ΜL (ref 0.6–4.47)
LYMPHOCYTES NFR BLD AUTO: 28 % (ref 14–44)
MCH RBC QN AUTO: 34.1 PG (ref 26.8–34.3)
MCHC RBC AUTO-ENTMCNC: 34.5 G/DL (ref 31.4–37.4)
MCV RBC AUTO: 99 FL (ref 82–98)
MONOCYTES # BLD AUTO: 0.49 THOUSAND/ΜL (ref 0.17–1.22)
MONOCYTES NFR BLD AUTO: 7 % (ref 4–12)
NEUTROPHILS # BLD AUTO: 4.33 THOUSANDS/ΜL (ref 1.85–7.62)
NEUTS SEG NFR BLD AUTO: 62 % (ref 43–75)
NITRITE UR QL STRIP: NEGATIVE
NON-SQ EPI CELLS URNS QL MICRO: NORMAL /HPF
NRBC BLD AUTO-RTO: 0 /100 WBCS
PH UR STRIP.AUTO: 7.5 [PH]
PLATELET # BLD AUTO: 182 THOUSANDS/UL (ref 149–390)
PMV BLD AUTO: 9.1 FL (ref 8.9–12.7)
POTASSIUM SERPL-SCNC: 4.6 MMOL/L (ref 3.5–5.3)
PROT SERPL-MCNC: 6.8 G/DL (ref 6.4–8.4)
PROT UR STRIP-MCNC: ABNORMAL MG/DL
RBC # BLD AUTO: 4.78 MILLION/UL (ref 3.81–5.12)
RBC #/AREA URNS AUTO: NORMAL /HPF
SODIUM SERPL-SCNC: 140 MMOL/L (ref 135–147)
SP GR UR STRIP.AUTO: <1.005 (ref 1–1.03)
UROBILINOGEN UR STRIP-ACNC: <2 MG/DL
WBC # BLD AUTO: 7.05 THOUSAND/UL (ref 4.31–10.16)
WBC #/AREA URNS AUTO: NORMAL /HPF

## 2025-02-18 PROCEDURE — 80053 COMPREHEN METABOLIC PANEL: CPT | Performed by: STUDENT IN AN ORGANIZED HEALTH CARE EDUCATION/TRAINING PROGRAM

## 2025-02-18 PROCEDURE — 83690 ASSAY OF LIPASE: CPT | Performed by: STUDENT IN AN ORGANIZED HEALTH CARE EDUCATION/TRAINING PROGRAM

## 2025-02-18 PROCEDURE — 99284 EMERGENCY DEPT VISIT MOD MDM: CPT

## 2025-02-18 PROCEDURE — 81001 URINALYSIS AUTO W/SCOPE: CPT | Performed by: STUDENT IN AN ORGANIZED HEALTH CARE EDUCATION/TRAINING PROGRAM

## 2025-02-18 PROCEDURE — 74177 CT ABD & PELVIS W/CONTRAST: CPT

## 2025-02-18 PROCEDURE — 85025 COMPLETE CBC W/AUTO DIFF WBC: CPT | Performed by: STUDENT IN AN ORGANIZED HEALTH CARE EDUCATION/TRAINING PROGRAM

## 2025-02-18 PROCEDURE — 81025 URINE PREGNANCY TEST: CPT | Performed by: STUDENT IN AN ORGANIZED HEALTH CARE EDUCATION/TRAINING PROGRAM

## 2025-02-18 PROCEDURE — 99285 EMERGENCY DEPT VISIT HI MDM: CPT | Performed by: STUDENT IN AN ORGANIZED HEALTH CARE EDUCATION/TRAINING PROGRAM

## 2025-02-18 PROCEDURE — 36415 COLL VENOUS BLD VENIPUNCTURE: CPT | Performed by: STUDENT IN AN ORGANIZED HEALTH CARE EDUCATION/TRAINING PROGRAM

## 2025-02-18 RX ORDER — KETOROLAC TROMETHAMINE 30 MG/ML
15 INJECTION, SOLUTION INTRAMUSCULAR; INTRAVENOUS EVERY 6 HOURS SCHEDULED
Status: DISCONTINUED | OUTPATIENT
Start: 2025-02-18 | End: 2025-02-20 | Stop reason: HOSPADM

## 2025-02-18 RX ORDER — NICOTINE 21 MG/24HR
1 PATCH, TRANSDERMAL 24 HOURS TRANSDERMAL DAILY
Status: DISCONTINUED | OUTPATIENT
Start: 2025-02-19 | End: 2025-02-20 | Stop reason: HOSPADM

## 2025-02-18 RX ORDER — SODIUM CHLORIDE 9 MG/ML
125 INJECTION, SOLUTION INTRAVENOUS CONTINUOUS
Status: DISCONTINUED | OUTPATIENT
Start: 2025-02-18 | End: 2025-02-19

## 2025-02-18 RX ORDER — ONDANSETRON 2 MG/ML
4 INJECTION INTRAMUSCULAR; INTRAVENOUS EVERY 6 HOURS PRN
Status: DISCONTINUED | OUTPATIENT
Start: 2025-02-18 | End: 2025-02-20 | Stop reason: HOSPADM

## 2025-02-18 RX ORDER — NICOTINE 21 MG/24HR
14 PATCH, TRANSDERMAL 24 HOURS TRANSDERMAL ONCE
Status: COMPLETED | OUTPATIENT
Start: 2025-02-18 | End: 2025-02-19

## 2025-02-18 RX ORDER — OXYCODONE AND ACETAMINOPHEN 5; 325 MG/1; MG/1
1 TABLET ORAL EVERY 4 HOURS PRN
Refills: 0 | Status: DISCONTINUED | OUTPATIENT
Start: 2025-02-18 | End: 2025-02-20 | Stop reason: HOSPADM

## 2025-02-18 RX ORDER — HYDROMORPHONE HCL/PF 1 MG/ML
0.5 SYRINGE (ML) INJECTION
Refills: 0 | Status: DISCONTINUED | OUTPATIENT
Start: 2025-02-18 | End: 2025-02-20 | Stop reason: HOSPADM

## 2025-02-18 RX ORDER — HEPARIN SODIUM 5000 [USP'U]/ML
5000 INJECTION, SOLUTION INTRAVENOUS; SUBCUTANEOUS EVERY 8 HOURS SCHEDULED
Status: DISCONTINUED | OUTPATIENT
Start: 2025-02-18 | End: 2025-02-20 | Stop reason: HOSPADM

## 2025-02-18 RX ADMIN — HEPARIN SODIUM 5000 UNITS: 5000 INJECTION, SOLUTION INTRAVENOUS; SUBCUTANEOUS at 22:39

## 2025-02-18 RX ADMIN — IOHEXOL 100 ML: 350 INJECTION, SOLUTION INTRAVENOUS at 19:19

## 2025-02-18 RX ADMIN — KETOROLAC TROMETHAMINE 15 MG: 30 INJECTION, SOLUTION INTRAMUSCULAR; INTRAVENOUS at 20:28

## 2025-02-18 RX ADMIN — SODIUM CHLORIDE 125 ML/HR: 0.9 INJECTION, SOLUTION INTRAVENOUS at 22:18

## 2025-02-18 RX ADMIN — PIPERACILLIN AND TAZOBACTAM 4.5 G: 4; .5 INJECTION, POWDER, FOR SOLUTION INTRAVENOUS at 20:28

## 2025-02-18 RX ADMIN — NICOTINE 14 MG: 14 PATCH, EXTENDED RELEASE TRANSDERMAL at 20:27

## 2025-02-18 NOTE — LETTER
[address] [address]  Dept: [phone]    February 20, 2025     Patient: Olivia Yeager   YOB: 1980   Date of Visit: 2/18/2025       To Whom it May Concern:    Olivia Yeager is under my professional care. She was seen in the hospital from 2/18/2025 to 02/20/25. She may return to work 03/05/2025    If you have any questions or concerns, please don't hesitate to call.         Sincerely,          Jonathan Jean Baptiste PA-C

## 2025-02-19 ENCOUNTER — ANESTHESIA (OUTPATIENT)
Dept: PERIOP | Facility: HOSPITAL | Age: 45
End: 2025-02-19
Payer: COMMERCIAL

## 2025-02-19 ENCOUNTER — ANESTHESIA EVENT (OUTPATIENT)
Dept: PERIOP | Facility: HOSPITAL | Age: 45
End: 2025-02-19
Payer: COMMERCIAL

## 2025-02-19 PROBLEM — F17.200 SMOKING: Status: ACTIVE | Noted: 2025-02-19

## 2025-02-19 PROBLEM — K37 APPENDICITIS: Status: ACTIVE | Noted: 2025-02-19

## 2025-02-19 PROCEDURE — 44970 LAPAROSCOPY APPENDECTOMY: CPT | Performed by: SURGERY

## 2025-02-19 PROCEDURE — 99223 1ST HOSP IP/OBS HIGH 75: CPT | Performed by: SURGERY

## 2025-02-19 PROCEDURE — 44970 LAPAROSCOPY APPENDECTOMY: CPT | Performed by: PHYSICIAN ASSISTANT

## 2025-02-19 PROCEDURE — 88304 TISSUE EXAM BY PATHOLOGIST: CPT | Performed by: PATHOLOGY

## 2025-02-19 RX ORDER — ALBUTEROL SULFATE 90 UG/1
INHALANT RESPIRATORY (INHALATION) AS NEEDED
Status: DISCONTINUED | OUTPATIENT
Start: 2025-02-19 | End: 2025-02-19

## 2025-02-19 RX ORDER — ALBUTEROL SULFATE 90 UG/1
2 INHALANT RESPIRATORY (INHALATION) EVERY 6 HOURS PRN
Status: DISCONTINUED | OUTPATIENT
Start: 2025-02-19 | End: 2025-02-20 | Stop reason: HOSPADM

## 2025-02-19 RX ORDER — DEXAMETHASONE SODIUM PHOSPHATE 10 MG/ML
INJECTION, SOLUTION INTRAMUSCULAR; INTRAVENOUS AS NEEDED
Status: DISCONTINUED | OUTPATIENT
Start: 2025-02-19 | End: 2025-02-19

## 2025-02-19 RX ORDER — MEPERIDINE HYDROCHLORIDE 25 MG/ML
12.5 INJECTION INTRAMUSCULAR; INTRAVENOUS; SUBCUTANEOUS
Status: DISCONTINUED | OUTPATIENT
Start: 2025-02-19 | End: 2025-02-20 | Stop reason: HOSPADM

## 2025-02-19 RX ORDER — BUPIVACAINE HYDROCHLORIDE AND EPINEPHRINE 5; 5 MG/ML; UG/ML
INJECTION, SOLUTION EPIDURAL; INTRACAUDAL; PERINEURAL AS NEEDED
Status: DISCONTINUED | OUTPATIENT
Start: 2025-02-19 | End: 2025-02-19 | Stop reason: HOSPADM

## 2025-02-19 RX ORDER — SODIUM CHLORIDE, SODIUM LACTATE, POTASSIUM CHLORIDE, CALCIUM CHLORIDE 600; 310; 30; 20 MG/100ML; MG/100ML; MG/100ML; MG/100ML
INJECTION, SOLUTION INTRAVENOUS CONTINUOUS PRN
Status: DISCONTINUED | OUTPATIENT
Start: 2025-02-19 | End: 2025-02-19

## 2025-02-19 RX ORDER — ONDANSETRON 2 MG/ML
INJECTION INTRAMUSCULAR; INTRAVENOUS AS NEEDED
Status: DISCONTINUED | OUTPATIENT
Start: 2025-02-19 | End: 2025-02-19

## 2025-02-19 RX ORDER — ONDANSETRON 2 MG/ML
4 INJECTION INTRAMUSCULAR; INTRAVENOUS ONCE AS NEEDED
Status: DISCONTINUED | OUTPATIENT
Start: 2025-02-19 | End: 2025-02-19

## 2025-02-19 RX ORDER — FENTANYL CITRATE 50 UG/ML
INJECTION, SOLUTION INTRAMUSCULAR; INTRAVENOUS AS NEEDED
Status: DISCONTINUED | OUTPATIENT
Start: 2025-02-19 | End: 2025-02-19

## 2025-02-19 RX ORDER — SODIUM CHLORIDE 9 MG/ML
75 INJECTION, SOLUTION INTRAVENOUS CONTINUOUS
Status: DISCONTINUED | OUTPATIENT
Start: 2025-02-19 | End: 2025-02-20 | Stop reason: HOSPADM

## 2025-02-19 RX ORDER — FLUTICASONE FUROATE AND VILANTEROL 200; 25 UG/1; UG/1
1 POWDER RESPIRATORY (INHALATION) DAILY
Status: DISCONTINUED | OUTPATIENT
Start: 2025-02-20 | End: 2025-02-20 | Stop reason: HOSPADM

## 2025-02-19 RX ORDER — MEGESTROL ACETATE 40 MG/1
20 TABLET ORAL DAILY
Status: DISCONTINUED | OUTPATIENT
Start: 2025-02-20 | End: 2025-02-20 | Stop reason: HOSPADM

## 2025-02-19 RX ORDER — PROPOFOL 10 MG/ML
INJECTION, EMULSION INTRAVENOUS AS NEEDED
Status: DISCONTINUED | OUTPATIENT
Start: 2025-02-19 | End: 2025-02-19

## 2025-02-19 RX ORDER — ALBUTEROL SULFATE 90 UG/1
2 INHALANT RESPIRATORY (INHALATION) EVERY 6 HOURS PRN
Status: DISCONTINUED | OUTPATIENT
Start: 2025-02-19 | End: 2025-02-19 | Stop reason: SDUPTHER

## 2025-02-19 RX ORDER — MAGNESIUM HYDROXIDE 1200 MG/15ML
LIQUID ORAL AS NEEDED
Status: DISCONTINUED | OUTPATIENT
Start: 2025-02-19 | End: 2025-02-19 | Stop reason: HOSPADM

## 2025-02-19 RX ORDER — LIDOCAINE HYDROCHLORIDE 10 MG/ML
INJECTION, SOLUTION EPIDURAL; INFILTRATION; INTRACAUDAL; PERINEURAL AS NEEDED
Status: DISCONTINUED | OUTPATIENT
Start: 2025-02-19 | End: 2025-02-19

## 2025-02-19 RX ORDER — MIDAZOLAM HYDROCHLORIDE 2 MG/2ML
INJECTION, SOLUTION INTRAMUSCULAR; INTRAVENOUS AS NEEDED
Status: DISCONTINUED | OUTPATIENT
Start: 2025-02-19 | End: 2025-02-19

## 2025-02-19 RX ORDER — PROMETHAZINE HYDROCHLORIDE 25 MG/ML
12.5 INJECTION, SOLUTION INTRAMUSCULAR; INTRAVENOUS ONCE AS NEEDED
Status: DISCONTINUED | OUTPATIENT
Start: 2025-02-19 | End: 2025-02-20 | Stop reason: HOSPADM

## 2025-02-19 RX ORDER — ROCURONIUM BROMIDE 10 MG/ML
INJECTION, SOLUTION INTRAVENOUS AS NEEDED
Status: DISCONTINUED | OUTPATIENT
Start: 2025-02-19 | End: 2025-02-19

## 2025-02-19 RX ORDER — FENTANYL CITRATE/PF 50 MCG/ML
50 SYRINGE (ML) INJECTION
Status: DISCONTINUED | OUTPATIENT
Start: 2025-02-19 | End: 2025-02-20 | Stop reason: HOSPADM

## 2025-02-19 RX ORDER — BUPROPION HYDROCHLORIDE 150 MG/1
150 TABLET ORAL DAILY
Status: DISCONTINUED | OUTPATIENT
Start: 2025-02-19 | End: 2025-02-19

## 2025-02-19 RX ADMIN — PIPERACILLIN AND TAZOBACTAM 4.5 G: 4; .5 INJECTION, POWDER, FOR SOLUTION INTRAVENOUS at 00:52

## 2025-02-19 RX ADMIN — KETOROLAC TROMETHAMINE 15 MG: 30 INJECTION, SOLUTION INTRAMUSCULAR; INTRAVENOUS at 00:52

## 2025-02-19 RX ADMIN — KETOROLAC TROMETHAMINE 15 MG: 30 INJECTION, SOLUTION INTRAMUSCULAR; INTRAVENOUS at 11:45

## 2025-02-19 RX ADMIN — DEXAMETHASONE SODIUM PHOSPHATE 10 MG: 10 INJECTION, SOLUTION INTRAMUSCULAR; INTRAVENOUS at 13:32

## 2025-02-19 RX ADMIN — PROPOFOL 50 MG: 10 INJECTION, EMULSION INTRAVENOUS at 14:05

## 2025-02-19 RX ADMIN — PIPERACILLIN AND TAZOBACTAM 4.5 G: 4; .5 INJECTION, POWDER, FOR SOLUTION INTRAVENOUS at 09:24

## 2025-02-19 RX ADMIN — KETOROLAC TROMETHAMINE 15 MG: 30 INJECTION, SOLUTION INTRAMUSCULAR; INTRAVENOUS at 06:55

## 2025-02-19 RX ADMIN — FENTANYL CITRATE 50 MCG: 50 INJECTION, SOLUTION INTRAMUSCULAR; INTRAVENOUS at 13:42

## 2025-02-19 RX ADMIN — NICOTINE 1 PATCH: 14 PATCH, EXTENDED RELEASE TRANSDERMAL at 09:24

## 2025-02-19 RX ADMIN — SUGAMMADEX 300 MG: 100 INJECTION, SOLUTION INTRAVENOUS at 14:04

## 2025-02-19 RX ADMIN — ROCURONIUM BROMIDE 50 MG: 10 INJECTION, SOLUTION INTRAVENOUS at 13:26

## 2025-02-19 RX ADMIN — ONDANSETRON 4 MG: 2 INJECTION INTRAMUSCULAR; INTRAVENOUS at 13:32

## 2025-02-19 RX ADMIN — SODIUM CHLORIDE, SODIUM LACTATE, POTASSIUM CHLORIDE, AND CALCIUM CHLORIDE: .6; .31; .03; .02 INJECTION, SOLUTION INTRAVENOUS at 13:18

## 2025-02-19 RX ADMIN — HEPARIN SODIUM 5000 UNITS: 5000 INJECTION, SOLUTION INTRAVENOUS; SUBCUTANEOUS at 17:08

## 2025-02-19 RX ADMIN — LIDOCAINE HYDROCHLORIDE 50 MG: 10 INJECTION, SOLUTION EPIDURAL; INFILTRATION; INTRACAUDAL; PERINEURAL at 13:24

## 2025-02-19 RX ADMIN — FENTANYL CITRATE 50 MCG: 50 INJECTION, SOLUTION INTRAMUSCULAR; INTRAVENOUS at 13:24

## 2025-02-19 RX ADMIN — KETOROLAC TROMETHAMINE 15 MG: 30 INJECTION, SOLUTION INTRAMUSCULAR; INTRAVENOUS at 17:08

## 2025-02-19 RX ADMIN — KETOROLAC TROMETHAMINE 15 MG: 30 INJECTION, SOLUTION INTRAMUSCULAR; INTRAVENOUS at 23:52

## 2025-02-19 RX ADMIN — PROPOFOL 50 MG: 10 INJECTION, EMULSION INTRAVENOUS at 14:07

## 2025-02-19 RX ADMIN — PROPOFOL 200 MG: 10 INJECTION, EMULSION INTRAVENOUS at 13:25

## 2025-02-19 RX ADMIN — SODIUM CHLORIDE 75 ML/HR: 0.9 INJECTION, SOLUTION INTRAVENOUS at 21:34

## 2025-02-19 RX ADMIN — ALBUTEROL SULFATE 4 PUFF: 90 AEROSOL, METERED RESPIRATORY (INHALATION) at 14:09

## 2025-02-19 RX ADMIN — OXYCODONE HYDROCHLORIDE AND ACETAMINOPHEN 1 TABLET: 5; 325 TABLET ORAL at 19:44

## 2025-02-19 RX ADMIN — MIDAZOLAM 2 MG: 1 INJECTION INTRAMUSCULAR; INTRAVENOUS at 13:18

## 2025-02-19 NOTE — UTILIZATION REVIEW
Initial Clinical Review    Admission: Date/Time/Statement:   Admission Orders (From admission, onward)       Ordered        02/18/25 1953  Place in Observation  Once                          Orders Placed This Encounter   Procedures    Place in Observation     Standing Status:   Standing     Number of Occurrences:   1     Level of Care:   Med Surg [16]     ED Arrival Information       Expected   -    Arrival   2/18/2025 16:50    Acuity   Urgent              Means of arrival   Walk-In    Escorted by   Spouse    Service   Surgery-General    Admission type   Emergency              Arrival complaint   Abdominal Pain             Chief Complaint   Patient presents with    Abdominal Pain     RLQ abd pain for a week. Sent here from [facility] to r.o appendicitis.        Initial Presentation: 44 y.o. female to ED as walk in presents w R lower quadrant pain. PMH depression, hypercholesteremia, presenting with RLQ abdominal pain x 5 days. Reports she has had a cough and intially attributed pain to coughing fits. Patient is a current every day smoker. Smoke 1.5 packs a day. 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   Exam afebrile, no leukocytosis, CTa/p reveals early appendicitis.  Date: 2/19   Day 2:   NPO  Plan for lap appy possible open today.   Consent obtain.   IV antibiotics zosyn.     ED Treatment-Medication Administration from 02/18/2025 1650 to 02/18/2025 2109         Date/Time Order Dose Route Action     02/18/2025 1919 iohexol (OMNIPAQUE) 350 MG/ML injection (MULTI-DOSE) 100 mL 100 mL Intravenous Given     02/18/2025 2027 nicotine (NICODERM CQ) 14 mg/24hr TD 24 hr patch 14 mg 14 mg Transdermal Medication Applied     02/18/2025 2028 ketorolac (TORADOL) injection 15 mg 15 mg Intravenous Given     02/18/2025 2028 piperacillin-tazobactam (ZOSYN) IVPB 4.5 g 4.5 g Intravenous New Bag            Scheduled Medications:  heparin (porcine), 5,000 Units, Subcutaneous, Q8H MARIELENA  ketorolac, 15 mg, Intravenous, Q6H MARIELENA  nicotine,  1 patch, Transdermal, Daily  nicotine, 14 mg, Transdermal, Once  piperacillin-tazobactam, 4.5 g, Intravenous, Q8H  pneumococcal 20-aleks conj vacc, 0.5 mL, Intramuscular, Once      Continuous IV Infusions:  sodium chloride, 125 mL/hr, Intravenous, Continuous      PRN Meds:  HYDROmorphone, 0.5 mg, Intravenous, Q3H PRN  ondansetron, 4 mg, Intravenous, Q6H PRN  oxyCODONE-acetaminophen, 1 tablet, Oral, Q4H PRN      ED Triage Vitals [02/18/25 1700]   Temperature Pulse Respirations Blood Pressure SpO2 Pain Score   97.8 °F (36.6 °C) 81 18 159/78 97 % 6     Weight (last 2 days)       Date/Time Weight    02/18/25 2153 77.1 (170)    02/18/25 1700 77.1 (170)            Vital Signs (last 3 days)       Date/Time Temp Pulse Resp BP MAP (mmHg) SpO2 O2 Device Patient Position - Orthostatic VS Glennie Coma Scale Score Pain    02/19/25 0921 98 °F (36.7 °C) 67 -- 141/85 107 98 % None (Room air) Sitting -- 4    02/19/25 0655 -- -- -- -- -- -- -- -- -- 4    02/19/25 0300 98.4 °F (36.9 °C) 65 18 132/65 -- 98 % None (Room air) Lying -- --    02/19/25 0052 -- -- -- -- -- -- -- -- -- 5    02/18/25 2300 98.4 °F (36.9 °C) 77 16 140/70 98 100 % None (Room air) Lying -- --    02/18/25 2241 98.3 °F (36.8 °C) 60 17 144/75 -- 96 % None (Room air) -- -- --    02/18/25 2153 -- -- -- -- -- -- -- -- -- No Pain    02/18/25 2052 98.4 °F (36.9 °C) 64 18 120/70 89 99 % None (Room air) Lying -- --    02/18/25 2028 -- -- -- -- -- -- -- -- -- 2    02/18/25 1829 -- -- -- -- -- -- None (Room air) -- 15 --    02/18/25 1700 97.8 °F (36.6 °C) 81 18 159/78 -- 97 % None (Room air) Sitting -- 6              Pertinent Labs/Diagnostic Test Results:   Radiology:  CT Abdomen pelvis with contrast   Final Interpretation by Carlos Newman MD (02/18 1946)      1.  Mildly dilated appendix by size criteria measuring up to 9 mm and tapering slightly more distally. No periappendiceal fat stranding. Findings are equivocal for early appendicitis. Surgical consultation is  "recommended.   2.  Mild apparent bladder wall thickening may in part be due to underdistention. Correlation with urinalysis is recommended.      The study was marked in EPIC for immediate notification.      Workstation performed: [id number]           Cardiology:  No orders to display     GI:  No orders to display           Results from last 7 days   Lab Units 02/18/25  1826   WBC Thousand/uL 7.05   HEMOGLOBIN g/dL 16.3*   HEMATOCRIT % 47.3*   PLATELETS Thousands/uL 182   TOTAL NEUT ABS Thousands/µL 4.33         Results from last 7 days   Lab Units 02/18/25  1826   SODIUM mmol/L 140   POTASSIUM mmol/L 4.6   CHLORIDE mmol/L 109*   CO2 mmol/L 24   ANION GAP mmol/L 7   BUN mg/dL 13   CREATININE mg/dL 0.65   EGFR ml/min/1.73sq m 107   CALCIUM mg/dL 9.7     Results from last 7 days   Lab Units 02/18/25  1826   AST U/L 15   ALT U/L 13   ALK PHOS U/L 38   TOTAL PROTEIN g/dL 6.8   ALBUMIN g/dL 4.3   TOTAL BILIRUBIN mg/dL 0.40         Results from last 7 days   Lab Units 02/18/25  1826   GLUCOSE RANDOM mg/dL 103             No results found for: \"BETA-HYDROXYBUTYRATE\"                                                                       Results from last 7 days   Lab Units 02/18/25  1826   LIPASE u/L 25                 Results from last 7 days   Lab Units 02/18/25  2239   CLARITY UA  Clear   COLOR UA  Yellow   SPEC GRAV UA  <1.005*   PH UA  7.5   GLUCOSE UA mg/dl Negative   KETONES UA mg/dl Trace*   BLOOD UA  Small*   PROTEIN UA mg/dl 30 (1+)*   NITRITE UA  Negative   BILIRUBIN UA  Negative   UROBILINOGEN UA (BE) mg/dl <2.0   LEUKOCYTES UA  Negative   WBC UA /hpf 0-1   RBC UA /hpf 0-5   BACTERIA UA /hpf Occasional   EPITHELIAL CELLS WET PREP /hpf Occasional                                                   Past Medical History:   Diagnosis Date    Ankle fracture, right     6/2016  - no surgery    Bronchitis     past 6 mos    Depression     Hypercholesteremia     Irregular menstrual bleeding      Present on " Admission:  **None**      Admitting Diagnosis: Abdominal pain [R10.9]  Acute appendicitis [K35.80]  Age/Sex: 44 y.o. female    Network Utilization Review Department  ATTENTION: Please call with any questions or concerns to [phone] and carefully listen to the prompts so that you are directed to the right person. All voicemails are confidential.   For Discharge needs, contact Care Management DC Support Team at [phone] opt. 2  Send all requests for admission clinical reviews, approved or denied determinations and any other requests to dedicated fax number below belonging to the campus where the patient is receiving treatment. List of dedicated fax numbers for the Facilities:  FACILITY NAME UR FAX NUMBER   ADMISSION DENIALS (Administrative/Medical Necessity) [phone]   DISCHARGE SUPPORT TEAM (NETWORK) [phone]   PARENT CHILD HEALTH (Maternity/NICU/Pediatrics) [phone]   [facility] [phone]   [facility] [phone]   Count includes the [facility] [phone]   [facility] [phone]   [facility] [phone]   [facility] [phone]   [facility] [phone]   [facility] [phone]   [facility] [phone]   Critical access hospital [phone]   [facility] [phone]   [facility] [phone]

## 2025-02-19 NOTE — ED NOTES
Ok to eat and drink per MD. She is NPO after midnight. Pt given box lunch.      Alie Cruz RN  02/18/25 1958

## 2025-02-19 NOTE — PROGRESS NOTES
Patient alert and awake, dressing clean, dry and intact, vital signs within normal limits. Patient transported via bed to [address] [place] [address] with [facility]. Bedside report given to MAVERICK Gonzalez. Bed in lowest position and locked, side rails up, bed alarm on, and call bell within reach.

## 2025-02-19 NOTE — ANESTHESIA PREPROCEDURE EVALUATION
Procedure:  APPENDECTOMY LAPAROSCOPIC (Abdomen)    Relevant Problems   PULMONARY   (+) Smoking        Physical Exam    Airway    Mallampati score: II  TM Distance: >3 FB  Neck ROM: full     Dental        Cardiovascular  Rhythm: regular, Rate: normal    Pulmonary   Breath sounds clear to auscultation    Other Findings  post-pubertal.      Anesthesia Plan  ASA Score- 2     Anesthesia Type- general with ASA Monitors.         Additional Monitors:     Airway Plan: ETT.           Plan Factors-    Chart reviewed.        Patient is a current smoker.  Patient instructed to abstain from smoking on day of procedure. Patient did not smoke on day of surgery.            Induction- intravenous.    Postoperative Plan- Plan for postoperative opioid use.     Perioperative Resuscitation Plan - Level 1 - Full Code.       Informed Consent- Anesthetic plan and risks discussed with patient.  I personally reviewed this patient with the CRNA. Discussed and agreed on the Anesthesia Plan with the CRNA..      NPO Status:  No vitals data found for the desired time range.

## 2025-02-19 NOTE — ANESTHESIA POSTPROCEDURE EVALUATION
Post-Op Assessment Note    CV Status:  Stable  Pain Score: 0    Pain management: adequate       Mental Status:  Alert   Hydration Status:  Stable   PONV Controlled:  None   Airway Patency:  Patent     Post Op Vitals Reviewed: Yes    No anethesia notable event occurred.    Staff: Anesthesiologist, CRNA           Last Filed PACU Vitals:  Vitals Value Taken Time   Temp     Pulse 89    /60    Resp 14    SpO2 98

## 2025-02-19 NOTE — ED PROVIDER NOTES
Time reflects when diagnosis was documented in both MDM as applicable and the Disposition within this note       Time User Action Codes Description Comment    2/18/2025  7:53 PM Newton Christine Add [K35.80] Acute appendicitis           ED Disposition       ED Disposition   Admit    Condition   Stable    Date/Time   Tue Feb 18, 2025  7:53 PM    Comment   Case was discussed with Dr Marion and the patient's admission status was agreed to be Admission Status: observation status to the service of Dr. Marion .               Assessment & Plan       Medical Decision Making  Patient is a 44 y.o. female who presents to the ED for right lower quadrant abdominal pain.  Patient is nontoxic, well-appearing.     Differential includes but is not limited to: Appendicitis, ovarian pathology.  Presentation not consistent with UTI given no urinary symptoms.    Plan: CT scan, labs, pain control as needed, reassess                   Amount and/or Complexity of Data Reviewed  Labs: ordered.  Radiology: ordered.    Risk  OTC drugs.  Prescription drug management.  Decision regarding hospitalization.        ED Course as of 02/18/25 2200   Tue Feb 18, 2025 1953 Discussed CT findings with Dr. Tirado with general surgery.  Will admit.  N.p.o. after midnight for surgery in the morning.       Medications   nicotine (NICODERM CQ) 14 mg/24hr TD 24 hr patch 14 mg (14 mg Transdermal Medication Applied 2/18/25 2027)   sodium chloride 0.9 % infusion (has no administration in time range)   nicotine (NICODERM CQ) 14 mg/24hr TD 24 hr patch 1 patch (has no administration in time range)   ketorolac (TORADOL) injection 15 mg (15 mg Intravenous Given 2/18/25 2028)   oxyCODONE-acetaminophen (PERCOCET) 5-325 mg per tablet 1 tablet (has no administration in time range)   HYDROmorphone (DILAUDID) injection 0.5 mg (has no administration in time range)   ondansetron (ZOFRAN) injection 4 mg (has no administration in time range)   heparin (porcine) subcutaneous  injection 5,000 Units (has no administration in time range)   piperacillin-tazobactam (ZOSYN) IVPB 4.5 g (0 g Intravenous Stopped 25)     Followed by   piperacillin-tazobactam (ZOSYN) IVPB (EXTENDED INFUSION) 4.5 g (has no administration in time range)   iohexol (OMNIPAQUE) 350 MG/ML injection (MULTI-DOSE) 100 mL (100 mL Intravenous Given 25)       ED Risk Strat Scores                                                History of Present Illness       Chief Complaint   Patient presents with    Abdominal Pain     RLQ abd pain for a week. Sent here from [facility] to r.o appendicitis.        Past Medical History:   Diagnosis Date    Ankle fracture, right     2016  - no surgery    Bronchitis     past 6 mos    Depression     Hypercholesteremia     Irregular menstrual bleeding       Past Surgical History:   Procedure Laterality Date     SECTION      LAPAROTOMY Right 10/10/2016    Procedure: LAPAROTOMY EXPLORATORY,  OVARIAN CYSTECTOMY, OOPHORECTOMY;  Surgeon: Link Pope MD;  Location: [facility]IN OR;  Service:     TUBAL LIGATION      WISDOM TOOTH EXTRACTION        History reviewed. No pertinent family history.   Social History     Tobacco Use    Smoking status: Every Day     Current packs/day: 1.50     Average packs/day: 1.5 packs/day for 20.0 years (30.0 ttl pk-yrs)     Types: Cigarettes    Smokeless tobacco: Never    Tobacco comments:     debating to quit   Vaping Use    Vaping status: Never Used   Substance Use Topics    Alcohol use: Yes     Alcohol/week: 6.0 standard drinks of alcohol     Types: 6 Cans of beer per week     Comment: everyday    Drug use: No      E-Cigarette/Vaping    E-Cigarette Use Never User       E-Cigarette/Vaping Substances    Nicotine No     THC No     CBD No     Flavoring No     Other No     Unknown No       I have reviewed and agree with the history as documented.     44-year-old female who presents to the emergency department for right lower quadrant abdominal pain.   Has been present for about a week.  Constant.  Getting worse.  Went to care now earlier today and she was referred to the ED for further evaluation.  Pain does not radiate.  No modifying factors.  No associated symptoms.  No nausea, vomiting, diarrhea, or urinary symptoms.  No other complaints or concerns.      Abdominal Pain      Review of Systems   Gastrointestinal:  Positive for abdominal pain.   All other systems reviewed and are negative.          Objective       ED Triage Vitals [02/18/25 1700]   Temperature Pulse Blood Pressure Respirations SpO2 Patient Position - Orthostatic VS   97.8 °F (36.6 °C) 81 159/78 18 97 % Sitting      Temp Source Heart Rate Source BP Location FiO2 (%) Pain Score    Temporal Monitor Right arm -- 6      Vitals      Date and Time Temp Pulse SpO2 Resp BP Pain Score FACES Pain Rating User   02/18/25 2153 -- -- -- -- -- No Pain -- DN   02/18/25 2052 98.4 °F (36.9 °C) 64 99 % 18 120/70 -- -- SW   02/18/25 2028 -- -- -- -- -- 2 -- YING   02/18/25 1700 97.8 °F (36.6 °C) 81 97 % 18 159/78 6 -- SF            Physical Exam  Vitals and nursing note reviewed.   Constitutional:       General: She is not in acute distress.     Appearance: She is well-developed. She is not diaphoretic.   HENT:      Head: Normocephalic and atraumatic.      Right Ear: External ear normal.      Left Ear: External ear normal.      Nose: Nose normal.   Eyes:      General: Lids are normal. No scleral icterus.  Cardiovascular:      Rate and Rhythm: Normal rate and regular rhythm.      Heart sounds: Normal heart sounds. No murmur heard.     No friction rub. No gallop.   Pulmonary:      Effort: Pulmonary effort is normal. No respiratory distress.      Breath sounds: Normal breath sounds. No wheezing or rales.   Abdominal:      Palpations: Abdomen is soft.      Tenderness: There is abdominal tenderness in the right lower quadrant. There is no guarding or rebound.   Musculoskeletal:         General: No deformity. Normal range  of motion.      Cervical back: Normal range of motion and neck supple.   Skin:     General: Skin is warm and dry.   Neurological:      General: No focal deficit present.      Mental Status: She is alert.   Psychiatric:         Mood and Affect: Mood normal.         Behavior: Behavior normal.         Results Reviewed       Procedure Component Value Units Date/Time    CMP [[id number]]  (Abnormal) Collected: 02/18/25 1826    Lab Status: Final result Specimen: Blood from Arm, Left Updated: 02/18/25 1905     Sodium 140 mmol/L      Potassium 4.6 mmol/L      Chloride 109 mmol/L      CO2 24 mmol/L      ANION GAP 7 mmol/L      BUN 13 mg/dL      Creatinine 0.65 mg/dL      Glucose 103 mg/dL      Calcium 9.7 mg/dL      AST 15 U/L      ALT 13 U/L      Alkaline Phosphatase 38 U/L      Total Protein 6.8 g/dL      Albumin 4.3 g/dL      Total Bilirubin 0.40 mg/dL      eGFR 107 ml/min/1.73sq m     Narrative:      National Kidney Disease Foundation guidelines for Chronic Kidney Disease (CKD):     Stage 1 with normal or high GFR (GFR > 90 mL/min/1.73 square meters)    Stage 2 Mild CKD (GFR = 60-89 mL/min/1.73 square meters)    Stage 3A Moderate CKD (GFR = 45-59 mL/min/1.73 square meters)    Stage 3B Moderate CKD (GFR = 30-44 mL/min/1.73 square meters)    Stage 4 Severe CKD (GFR = 15-29 mL/min/1.73 square meters)    Stage 5 End Stage CKD (GFR <15 mL/min/1.73 square meters)  Note: GFR calculation is accurate only with a steady state creatinine    Lipase [[id number]]  (Normal) Collected: 02/18/25 1826    Lab Status: Final result Specimen: Blood from Arm, Left Updated: 02/18/25 1905     Lipase 25 u/L     POCT pregnancy, urine [[id number]]  (Normal) Collected: 02/18/25 1847    Lab Status: Final result Updated: 02/18/25 1847     EXT Preg Test, Ur Negative     Control Valid    CBC and differential [[id number]]  (Abnormal) Collected: 02/18/25 1826    Lab Status: Final result Specimen: Blood from Arm, Left Updated: 02/18/25 1832     WBC 7.05  Thousand/uL      RBC 4.78 Million/uL      Hemoglobin 16.3 g/dL      Hematocrit 47.3 %      MCV 99 fL      MCH 34.1 pg      MCHC 34.5 g/dL      RDW 12.7 %      MPV 9.1 fL      Platelets 182 Thousands/uL      nRBC 0 /100 WBCs      Segmented % 62 %      Immature Grans % 0 %      Lymphocytes % 28 %      Monocytes % 7 %      Eosinophils Relative 3 %      Basophils Relative 0 %      Absolute Neutrophils 4.33 Thousands/µL      Absolute Immature Grans 0.01 Thousand/uL      Absolute Lymphocytes 2.00 Thousands/µL      Absolute Monocytes 0.49 Thousand/µL      Eosinophils Absolute 0.19 Thousand/µL      Basophils Absolute 0.03 Thousands/µL             CT Abdomen pelvis with contrast   Final Interpretation by Carlos Newman MD (02/18 1946)      1.  Mildly dilated appendix by size criteria measuring up to 9 mm and tapering slightly more distally. No periappendiceal fat stranding. Findings are equivocal for early appendicitis. Surgical consultation is recommended.   2.  Mild apparent bladder wall thickening may in part be due to underdistention. Correlation with urinalysis is recommended.      The study was marked in EPIC for immediate notification.      Workstation performed: [id number]             Procedures    ED Medication and Procedure Management   Prior to Admission Medications   Prescriptions Last Dose Informant Patient Reported? Taking?   albuterol (PROVENTIL HFA,VENTOLIN HFA) 90 mcg/act inhaler Not Taking Self Yes No   Sig: Inhale 2 puffs every 6 (six) hours as needed for wheezing   Patient not taking: Reported on 2/18/2025   albuterol (Ventolin HFA) 90 mcg/act inhaler Not Taking  No No   Sig: Inhale 2 puffs every 6 (six) hours as needed for wheezing   Patient not taking: Reported on 2/18/2025   buPROPion (WELLBUTRIN XL) 150 mg 24 hr tablet Not Taking  No No   Sig: Take 1 tablet (150 mg total) by mouth daily   Patient not taking: Reported on 2/18/2025   fluticasone-vilanterol (BREO ELLIPTA) 200-25 MCG/INH inhaler Not  Taking  No No   Sig: Inhale 1 puff daily Rinse mouth after use.   Patient not taking: Reported on 2/18/2025   ketotifen (ZADITOR) 0.025 % ophthalmic solution Not Taking  No No   Sig: Administer 1 drop into the left eye 2 (two) times a day   Patient not taking: Reported on 2/18/2025   megestrol (MEGACE) 40 mg tablet Not Taking  Yes No   Sig: Take 20 mg by mouth States started on 3/24   Pt states today that she is taking once daily at bedtime 4/20/21   Patient not taking: Reported on 2/18/2025   sertraline (ZOLOFT) 50 mg tablet Not Taking Self Yes No   Sig: Take 50 mg by mouth daily   Patient not taking: Reported on 2/18/2025      Facility-Administered Medications: None     Current Discharge Medication List        CONTINUE these medications which have NOT CHANGED    Details   !! albuterol (PROVENTIL HFA,VENTOLIN HFA) 90 mcg/act inhaler Inhale 2 puffs every 6 (six) hours as needed for wheezing      !! albuterol (Ventolin HFA) 90 mcg/act inhaler Inhale 2 puffs every 6 (six) hours as needed for wheezing  Qty: 18 g, Refills: 5    Comments: Substitution to a formulary equivalent within the same pharmaceutical class is authorized.  Associated Diagnoses: Cough      buPROPion (WELLBUTRIN XL) 150 mg 24 hr tablet Take 1 tablet (150 mg total) by mouth daily  Qty: 60 tablet, Refills: 3    Associated Diagnoses: Tobacco use      fluticasone-vilanterol (BREO ELLIPTA) 200-25 MCG/INH inhaler Inhale 1 puff daily Rinse mouth after use.  Qty: 1 Inhaler, Refills: 5    Associated Diagnoses: Cough      ketotifen (ZADITOR) 0.025 % ophthalmic solution Administer 1 drop into the left eye 2 (two) times a day  Qty: 5 mL, Refills: 0    Associated Diagnoses: Acute viral conjunctivitis of left eye      megestrol (MEGACE) 40 mg tablet Take 20 mg by mouth States started on 3/24   Pt states today that she is taking once daily at bedtime 4/20/21      sertraline (ZOLOFT) 50 mg tablet Take 50 mg by mouth daily       !! - Potential duplicate medications  found. Please discuss with provider.        No discharge procedures on file.  ED SEPSIS DOCUMENTATION   Time reflects when diagnosis was documented in both MDM as applicable and the Disposition within this note       Time User Action Codes Description Comment    2/18/2025  7:53 PM Newton Christine Add [K35.80] Acute appendicitis                  Newton Christine, DO  02/18/25 2159       Newton Christine, DO  02/18/25 2200

## 2025-02-19 NOTE — OP NOTE
OPERATIVE REPORT  PATIENT NAME: Olivia Yeager    :  1980  MRN: [id number]  Pt Location: [facility] OR ROOM 01    SURGERY DATE: 2025    Surgeons and Role:     * Randell Marion MD - Primary     * Newton Alfaro PA-C - Assisting    Preop Diagnosis:  Acute appendicitis [K35.80]    Post-Op Diagnosis Codes:     * Acute appendicitis [K35.80]    Procedure(s):  APPENDECTOMY LAPAROSCOPIC    Specimen(s):  ID Type Source Tests Collected by Time Destination   1 :  Tissue Appendix TISSUE EXAM Randell Marion MD 2025 1353        Estimated Blood Loss:   Minimal    Drains:  * No LDAs found *    Anesthesia Type:   General    Operative Indications:  Acute appendicitis [K35.80]      Operative Findings:  Distended appendix      Complications:   None    Procedure and Technique:  Laparoscopic appendectomy.    Patient was taken back to main operating, placed supine operating table, general anesthesia was induced and the abdomen was prepped and draped in normal fashion.    Utilizing the Veress needle at the umbilicus, a pneumoperitoneum was created 15 mmHg and maintained this level throughout the case.  An 11 mm trocars placed at the umbilicus.  2 additional 5 mm trocars were placed 1 in the low midline and 1 in the left upper quadrant.      Standard laparoscopic technique a very distended appendix was clearly identified.  The mesoappendix was transected with the harmonic scalpel.  Endoloops were placed around the base of the appendix.  The appendix was divided, placed in Endo Catch bag, and removed from the umbilical port.    The fascial opening at the umbilicus was closed with 0 Vicryl suture.  4-0 Monocryl was used to approximate the skin edges.  Exofin was placed as a dressing.    The patient woke from general anesthesia, was extubated in the operating room, and sent to the PACU in stable condition.    DUSTIN Alfaro was required for technical assistance and retraction.   I was present for the entire procedure., A  qualified resident physician was not available., and A physician assistant was required during the procedure for retraction, tissue handling, dissection and suturing.    Patient Disposition:  PACU              SIGNATURE: Randell Marion MD  DATE: February 19, 2025  TIME: 2:02 PM

## 2025-02-19 NOTE — H&P
H&P - Surgery-General   Name: Olivia Yeager 44 y.o. female I MRN: [id number]  Unit/Bed#: [address] [place] [address] I Date of Admission: 2025   Date of Service: 2025 I Hospital Day: 0     Assessment & Plan  Appendicitis  RLQ pain x 5 days.     CT a/p:   1.  Mildly dilated appendix by size criteria measuring up to 9 mm and tapering slightly more distally. No periappendiceal fat stranding. Findings are equivocal for early appendicitis. Surgical consultation is recommended.   2.  Mild apparent bladder wall thickening may in part be due to underdistention. Correlation with urinalysis is recommended.     No leukocytosis, Afebrile.     -Continue NPO  -Plan for lap appy possible open today.   -Went over the risk/benefits, alternative treatments, possible complications, and general details of the surgery/postoperative stay with the patient.  -Consent obtain.   -Case request placed.   -Coordinate care with OR staff and anesthesia.   -IV antibiotics zosyn.     History of Present Illness   Olivia Yeager is a 44 y.o. female past medical history of depression, hypercholesteremia, surgical hx of ex lap ovarian cystectomy and oophorectomy 2016 [place], tubal ligation and  presenting with RLQ abdominal pain x 5 days. Patient reports she has had a cough and intially attributed pain to coughing fits. Patient reports pain persisted causing her to be seen in the ED. Denies any fever/chills, nausea, vomiting, dysuria, change in bowel habits. Patient is a current every day smoker. Smoke 1.5 packs a day. 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 Denies drug use heroin, cocaine, methamphetamine.     Review of Systems   Constitutional:  Negative for chills, fatigue and fever.   HENT:  Negative for congestion, ear pain, hearing loss, postnasal drip, sinus pressure, sinus pain and sore throat.    Eyes:  Negative for pain and discharge.   Respiratory:  Positive for cough. Negative for chest tightness and shortness of breath.     Cardiovascular:  Negative for chest pain.   Gastrointestinal:  Positive for abdominal pain. Negative for constipation, nausea and vomiting.   Genitourinary:  Negative for difficulty urinating.   Musculoskeletal:  Negative for arthralgias and myalgias.   Skin:  Negative for rash.   Neurological:  Negative for dizziness and headaches.   Psychiatric/Behavioral:  Negative for behavioral problems.      Historical Information   Past Medical History:   Diagnosis Date    Ankle fracture, right     2016  - no surgery    Bronchitis     past 6 mos    Depression     Hypercholesteremia     Irregular menstrual bleeding      Past Surgical History:   Procedure Laterality Date     SECTION      LAPAROTOMY Right 10/10/2016    Procedure: LAPAROTOMY EXPLORATORY,  OVARIAN CYSTECTOMY, OOPHORECTOMY;  Surgeon: Link Pope MD;  Location: [facility];  Service:     TUBAL LIGATION      WISDOM TOOTH EXTRACTION       Social History     Tobacco Use    Smoking status: Every Day     Current packs/day: 1.50     Average packs/day: 1.5 packs/day for 20.0 years (30.0 ttl pk-yrs)     Types: Cigarettes    Smokeless tobacco: Never    Tobacco comments:     debating to quit   Vaping Use    Vaping status: Never Used   Substance and Sexual Activity    Alcohol use: Yes     Alcohol/week: 6.0 standard drinks of alcohol     Types: 6 Cans of beer per week     Comment: everyday    Drug use: No    Sexual activity: Not on file     E-Cigarette/Vaping    E-Cigarette Use Never User      E-Cigarette/Vaping Substances    Nicotine No     THC No     CBD No     Flavoring No     Other No     Unknown No      Family history non-contributory  Social History     Tobacco Use    Smoking status: Every Day     Current packs/day: 1.50     Average packs/day: 1.5 packs/day for 20.0 years (30.0 ttl pk-yrs)     Types: Cigarettes    Smokeless tobacco: Never    Tobacco comments:     debating to quit   Vaping Use    Vaping status: Never Used   Substance and Sexual Activity     Alcohol use: Yes     Alcohol/week: 6.0 standard drinks of alcohol     Types: 6 Cans of beer per week     Comment: everyday    Drug use: No    Sexual activity: Not on file       Current Facility-Administered Medications:     heparin (porcine) subcutaneous injection 5,000 Units, Q8H MARIELENA    HYDROmorphone (DILAUDID) injection 0.5 mg, Q3H PRN    ketorolac (TORADOL) injection 15 mg, Q6H MARIELENA    nicotine (NICODERM CQ) 14 mg/24hr TD 24 hr patch 1 patch, Daily    nicotine (NICODERM CQ) 14 mg/24hr TD 24 hr patch 14 mg, Once    ondansetron (ZOFRAN) injection 4 mg, Q6H PRN    oxyCODONE-acetaminophen (PERCOCET) 5-325 mg per tablet 1 tablet, Q4H PRN    [COMPLETED] piperacillin-tazobactam (ZOSYN) IVPB 4.5 g, Once, Last Rate: Stopped (02/18/25 2051) **FOLLOWED BY** piperacillin-tazobactam (ZOSYN) IVPB (EXTENDED INFUSION) 4.5 g, Q8H    pneumococcal 20-aleks conj vacc (PREVNAR 20) IM Injection 0.5 mL, Once    sodium chloride 0.9 % infusion, Continuous, Last Rate: 125 mL/hr (02/18/25 2218)  Prior to Admission Medications   Prescriptions Last Dose Informant Patient Reported? Taking?   albuterol (PROVENTIL HFA,VENTOLIN HFA) 90 mcg/act inhaler Not Taking Self Yes No   Sig: Inhale 2 puffs every 6 (six) hours as needed for wheezing   Patient not taking: Reported on 2/18/2025   albuterol (Ventolin HFA) 90 mcg/act inhaler Not Taking  No No   Sig: Inhale 2 puffs every 6 (six) hours as needed for wheezing   Patient not taking: Reported on 2/18/2025   buPROPion (WELLBUTRIN XL) 150 mg 24 hr tablet Not Taking  No No   Sig: Take 1 tablet (150 mg total) by mouth daily   Patient not taking: Reported on 2/18/2025   fluticasone-vilanterol (BREO ELLIPTA) 200-25 MCG/INH inhaler Not Taking  No No   Sig: Inhale 1 puff daily Rinse mouth after use.   Patient not taking: Reported on 2/18/2025   ketotifen (ZADITOR) 0.025 % ophthalmic solution Not Taking  No No   Sig: Administer 1 drop into the left eye 2 (two) times a day   Patient not taking: Reported on  2/18/2025   megestrol (MEGACE) 40 mg tablet Not Taking  Yes No   Sig: Take 20 mg by mouth States started on 3/24   Pt states today that she is taking once daily at bedtime 4/20/21   Patient not taking: Reported on 2/18/2025   sertraline (ZOLOFT) 50 mg tablet Not Taking Self Yes No   Sig: Take 50 mg by mouth daily   Patient not taking: Reported on 2/18/2025      Facility-Administered Medications: None     Patient has no known allergies.    Objective :  Temp:  [97.8 °F (36.6 °C)-98.4 °F (36.9 °C)] 98.4 °F (36.9 °C)  HR:  [60-81] 65  BP: (120-159)/(65-78) 132/65  Resp:  [16-18] 18  SpO2:  [96 %-100 %] 98 %  O2 Device: None (Room air)      Physical Exam  Vitals and nursing note reviewed.   Constitutional:       Appearance: Normal appearance.   HENT:      Head: Normocephalic and atraumatic.      Nose: Nose normal.      Mouth/Throat:      Mouth: Mucous membranes are moist.   Eyes:      Extraocular Movements: Extraocular movements intact.   Cardiovascular:      Rate and Rhythm: Normal rate.   Pulmonary:      Effort: Pulmonary effort is normal. No respiratory distress.   Abdominal:      General: There is no distension.      Palpations: Abdomen is soft.      Tenderness: There is abdominal tenderness in the right lower quadrant. There is no guarding or rebound. Positive signs include McBurney's sign and obturator sign.   Neurological:      Mental Status: She is alert.         Lab Results: I have reviewed the following results:  Recent Labs     02/18/25  1826   WBC 7.05   HGB 16.3*   HCT 47.3*      SODIUM 140   K 4.6   *   CO2 24   BUN 13   CREATININE 0.65   GLUC 103   AST 15   ALT 13   ALB 4.3   TBILI 0.40   ALKPHOS 38     Imaging reviewed.   CT Abdomen pelvis with contrast   Final Result by Carlos Newman MD (02/18 1946)      1.  Mildly dilated appendix by size criteria measuring up to 9 mm and tapering slightly more distally. No periappendiceal fat stranding. Findings are equivocal for early appendicitis.  Surgical consultation is recommended.   2.  Mild apparent bladder wall thickening may in part be due to underdistention. Correlation with urinalysis is recommended.      The study was marked in EPIC for immediate notification.      Workstation performed: [id number]               VTE Pharmacologic Prophylaxis: VTE covered by:  heparin (porcine), Subcutaneous, 5,000 Units at 02/18/25 5166     VTE Mechanical Prophylaxis: sequential compression device

## 2025-02-19 NOTE — ANESTHESIA POSTPROCEDURE EVALUATION
Post-Op Assessment Note    CV Status:  Stable  Pain Score: 0    Pain management: adequate       Mental Status:  Awake   Hydration Status:  Stable   PONV Controlled:  None   Airway Patency:  Patent     Post Op Vitals Reviewed: Yes    No anethesia notable event occurred.    Staff: Anesthesiologist           Last Filed PACU Vitals:  Vitals Value Taken Time   Temp 97.8 °F (36.6 °C) 02/19/25 1420   Pulse 62 02/19/25 1435   /67 02/19/25 1435   Resp 16 02/19/25 1435   SpO2 100 % 02/19/25 1435       Modified Yuri:     Vitals Value Taken Time   Activity 2 02/19/25 1420   Respiration 2 02/19/25 1420   Circulation 2 02/19/25 1420   Consciousness 1 02/19/25 1420   Oxygen Saturation 1 02/19/25 1420     Modified Yuri Score: 8

## 2025-02-19 NOTE — ASSESSMENT & PLAN NOTE
RLQ pain x 5 days.     CT a/p:   1.  Mildly dilated appendix by size criteria measuring up to 9 mm and tapering slightly more distally. No periappendiceal fat stranding. Findings are equivocal for early appendicitis. Surgical consultation is recommended.   2.  Mild apparent bladder wall thickening may in part be due to underdistention. Correlation with urinalysis is recommended.     No leukocytosis, Afebrile.     -Continue NPO  -Plan for lap appy possible open today.   -Went over the risk/benefits, alternative treatments, possible complications, and general details of the surgery/postoperative stay with the patient.  -Consent obtain.   -Case request placed.   -Coordinate care with OR staff and anesthesia.   -IV antibiotics zosyn.

## 2025-02-20 VITALS
TEMPERATURE: 97.7 F | SYSTOLIC BLOOD PRESSURE: 137 MMHG | RESPIRATION RATE: 19 BRPM | BODY MASS INDEX: 32.1 KG/M2 | HEIGHT: 61 IN | OXYGEN SATURATION: 97 % | WEIGHT: 170 LBS | DIASTOLIC BLOOD PRESSURE: 65 MMHG | HEART RATE: 55 BPM

## 2025-02-20 PROBLEM — Z90.49 S/P LAPAROSCOPIC APPENDECTOMY: Status: ACTIVE | Noted: 2025-02-20

## 2025-02-20 PROCEDURE — 99024 POSTOP FOLLOW-UP VISIT: CPT | Performed by: SURGERY

## 2025-02-20 RX ORDER — OXYCODONE AND ACETAMINOPHEN 5; 325 MG/1; MG/1
1 TABLET ORAL EVERY 8 HOURS PRN
Qty: 10 TABLET | Refills: 0 | Status: SHIPPED | OUTPATIENT
Start: 2025-02-20

## 2025-02-20 RX ADMIN — KETOROLAC TROMETHAMINE 15 MG: 30 INJECTION, SOLUTION INTRAMUSCULAR; INTRAVENOUS at 06:25

## 2025-02-20 RX ADMIN — HEPARIN SODIUM 5000 UNITS: 5000 INJECTION, SOLUTION INTRAVENOUS; SUBCUTANEOUS at 00:07

## 2025-02-20 NOTE — PROGRESS NOTES
Progress Note - Surgery-General   Name: Olivia Yeager 44 y.o. female I MRN: [id number]  Unit/Bed#: [address] [place] [address] Date of Admission: 2/18/2025   Date of Service: 2/20/2025 I Hospital Day: 0     Assessment & Plan  S/P laparoscopic appendectomy  POD#1 s/p lap appy.     Tolerating diet, passing gas and voiding spontanesouly.     -Discharge today.   -Dicussed with patient discharge instructions, wound care, follow up, and when to follow up sooner at length. All questions answered to satisfactory. Patient verbalizes understanding and agrees with treatment plan.   -Follow up with Dr. Marion in 10-14 days.     Smoking          Subjective   Patient was seen and examined bedside. Patient reports no acute change through the night. Denies any nausea, vomiting, fever, chills. Tolerating diet.     Objective :  Temp:  [97.7 °F (36.5 °C)-98.2 °F (36.8 °C)] 97.7 °F (36.5 °C)  HR:  [53-91] 55  BP: (125-145)/(64-87) 137/65  Resp:  [16-19] 19  SpO2:  [96 %-100 %] 97 %  O2 Device: None (Room air)    I/O         02/18 0701  02/19 0700 02/19 0701  02/20 0700 02/20 0701  02/21 0700    I.V. (mL/kg)  800 (10.4)     Total Intake(mL/kg)  800 (10.4)     Urine (mL/kg/hr)  0 (0)     Blood  0     Total Output  0     Net  +800                    Physical Exam  Constitutional:       Appearance: Normal appearance.   HENT:      Head: Normocephalic and atraumatic.      Nose: Nose normal.      Mouth/Throat:      Mouth: Mucous membranes are moist.   Eyes:      Extraocular Movements: Extraocular movements intact.   Cardiovascular:      Rate and Rhythm: Normal rate.   Pulmonary:      Effort: Pulmonary effort is normal.   Abdominal:      Palpations: Abdomen is soft.      Tenderness: There is abdominal tenderness (surrounding incision sight.). There is no guarding or rebound.      Comments: Incisions c/d/I.    Skin:     Capillary Refill: Capillary refill takes less than 2 seconds.   Neurological:      Mental Status: She is alert.           Lab Results: I  have reviewed the following results:  Recent Labs     02/18/25  1826   WBC 7.05   HGB 16.3*   HCT 47.3*      SODIUM 140   K 4.6   *   CO2 24   BUN 13   CREATININE 0.65   GLUC 103   AST 15   ALT 13   ALB 4.3   TBILI 0.40   ALKPHOS 38         VTE Pharmacologic Prophylaxis: VTE covered by:  heparin (porcine), Subcutaneous, 5,000 Units at 02/20/25 0007     VTE Mechanical Prophylaxis: sequential compression device

## 2025-02-20 NOTE — DISCHARGE INSTR - AVS FIRST PAGE
Post-Operative Care Instructions      1. General: You may feel pulling sensations around the wound or funny aches and pains around the incisions. This is normal. Even minor surgery is a change in your body and this is your body's reaction to it. If you have had abdominal surgery, it may help to support the incision with a small pillow or blanket for comfort when moving or coughing.    2. Wound care:  The glue over the incisions will fall off over the next week or two. If you have staples or stitches, they will be removed by the physician at your follow up appointment.    3. Showering: You may shower. Do not soak wound in a bath, hot tub, pool, lake, etc. Do not scrub or use exfoliants on the surgical wounds.    4. Activity: You may go up and down stairs, walk as much as you are comfortable, but walk at least 3 times each day. If you have had abdominal surgery, do not perform any strenuous exercise or lift anything heavier than 10-15 pounds for at least 3 weeks, unless cleared by your physician.    5. Diet: You may resume your regular diet.     6. Medications: Resume all of your previous medications, unless told otherwise by the doctor.  A good option for pain control is to start with acetaminophen(Tylenol) 650mg and ibuprofen(Advil) 400mg and alternate taking them every 2 hours.  If this is not sufficient then you make take the narcotic pain medicine as prescribed.  Insure that you do not take more than 4000 mg of Tylenol per day.  You do not need to take the narcotic pain medication unless you are having significant pain and discomfort.    7. Driving: You will need someone to drive you home on the day of surgery. Do not drive or make any important decisions while on narcotic pain medication or for up to 24 hours after anesthesia for surgery. Generally, you may drive when you're off all narcotic pain medications.    8. Upset Stomach: You may take Maalox, Tums, or similar items for an upset stomach. If your narcotic  pain medication causes an upset stomach, do not take it on an empty stomach. Try taking it with at least some crackers or toast.     9. Constipation: Patients often experienced constipation after surgery. You may take over-the-counter medication for this, such as Metamucil, Senokot, Colace, milk of magnesia, etc. If you experience significant nausea or vomiting after abdominal surgery, call the office before trying any of these medications.    10. Call the office: If you are experiencing any of the following: fevers above 101.5°, significant nausea or vomiting, if the wound develops drainage and/or excessive redness around the wound, or if you have significant diarrhea or other worsening symptoms.    11. Pain: You may be given a prescription for pain medication. This should be given to you upon discharge from the hospital.

## 2025-02-20 NOTE — ASSESSMENT & PLAN NOTE
POD#1 s/p lap appy.     Tolerating diet, passing gas and voiding spontanesouly.     -Discharge today.   -Dicussed with patient discharge instructions, wound care, follow up, and when to follow up sooner at length. All questions answered to satisfactory. Patient verbalizes understanding and agrees with treatment plan.   -Follow up with Dr. Marion in 10-14 days.

## 2025-02-21 ENCOUNTER — NURSE TRIAGE (OUTPATIENT)
Age: 45
End: 2025-02-21

## 2025-02-21 NOTE — TELEPHONE ENCOUNTER
"Patient of Dr Marion s/p appendectomy on 2/19.    Patient called complaining of continued discomfort. Rates 6/10. She stated she is taking the Percocet as directed but it is only lasting about 2.5 hours. She took an additional 1000mg of tylenol.    She stated it is in her RLQ and at her incision sites as well. She stated she is tolerating food and fluids and having BM. Denies fever or other symptoms at this time.    Advised her to alternate with ibuprofen as well and continue ice and rest. Maintain fluid intake. Her percocet is scheduled q8h PRN.    She is wondering if she needs something stronger.  Please advise  CB#[phone]    Reason for Disposition   MODERATE pain (e.g., interferes with normal activities that comes and goes (cramps) lasts > 24 hours  (Exception: Pain with Vomiting or Diarrhea - see that Protocol.)    Answer Assessment - Initial Assessment Questions  1. LOCATION: \"Where does it hurt?\"       RLQ, incision sites  2. RADIATION: \"Does the pain shoot anywhere else?\" (e.g., chest, back)      Right sided to back  3. ONSET: \"When did the pain begin?\" (e.g., minutes, hours or days ago)       Today  4. SUDDEN: \"Gradual or sudden onset?\"      gradual  5. PATTERN \"Does the pain come and go, or is it constant?\"      constant  6. SEVERITY: \"How bad is the pain?\"  (e.g., Scale 1-10; mild, moderate, or severe)      6/10  7. RECURRENT SYMPTOM: \"Have you ever had this type of stomach pain before?\" If Yes, ask: \"When was the last time?\" and \"What happened that time?\"       Denies, post op  8. CAUSE: \"What do you think is causing the stomach pain?\"      Post op appendectomy  9. RELIEVING/AGGRAVATING FACTORS: \"What makes it better or worse?\" (e.g., antacids, bending or twisting motion, bowel movement)      denies  10. OTHER SYMPTOMS: \"Do you have any other symptoms?\" (e.g., back pain, diarrhea, fever, urination pain, vomiting)        denies  11. PREGNANCY: \"Is there any chance you are pregnant?\" \"When was your " "last menstrual period?\"        denies    Protocols used: Abdominal Pain - Female-Adult-[place]    "

## 2025-02-26 PROCEDURE — 88304 TISSUE EXAM BY PATHOLOGIST: CPT | Performed by: PATHOLOGY

## 2025-03-04 ENCOUNTER — OFFICE VISIT (OUTPATIENT)
Dept: SURGERY | Facility: CLINIC | Age: 45
End: 2025-03-04

## 2025-03-04 VITALS — HEIGHT: 61 IN | WEIGHT: 169 LBS | TEMPERATURE: 96.7 F | BODY MASS INDEX: 31.91 KG/M2

## 2025-03-04 DIAGNOSIS — Z09 SURGERY FOLLOW-UP EXAMINATION: Primary | ICD-10-CM

## 2025-03-04 PROCEDURE — 99024 POSTOP FOLLOW-UP VISIT: CPT | Performed by: SURGERY

## 2025-03-04 NOTE — PROGRESS NOTES
Patient status post laparoscopic appendectomy 19 February 2025.  She is here for routine follow-up.  She reports minimal pain and no wound issues.    Pathology report:  Final Diagnosis   A. Appendix, appendectomy:  - Appendiceal diverticula with reactive changes.     Comment: The appendix is entirely submitted for microscopic examination.       Incisions healing nicely.  No signs of infection.  Patient counseled.  Follow-up as needed.

## (undated) DEVICE — INTENDED FOR TISSUE SEPARATION, AND OTHER PROCEDURES THAT REQUIRE A SHARP SURGICAL BLADE TO PUNCTURE OR CUT.: Brand: BARD-PARKER SAFETY BLADES SIZE 11, STERILE

## (undated) DEVICE — ADHESIVE SKN CLSR HISTOACRYL FLEX 0.5ML LF

## (undated) DEVICE — TROCAR: Brand: KII FIOS FIRST ENTRY

## (undated) DEVICE — Device

## (undated) DEVICE — EXOFIN PRECISION PEN HIGH VISCOSITY TOPICAL SKIN ADHESIVE: Brand: EXOFIN PRECISION PEN, 1G

## (undated) DEVICE — DRAPE UTILITY

## (undated) DEVICE — GLOVE SRG BIOGEL 8

## (undated) DEVICE — PACK GENERAL LF

## (undated) DEVICE — HARMONIC 1100 SHEARS, 36CM SHAFT LENGTH: Brand: HARMONIC

## (undated) DEVICE — STERILE DOUBLE BASIN SET PACK: Brand: CARDINAL HEALTH

## (undated) DEVICE — SUT MONOCRYL 4-0 PS-2 27 IN Y426H

## (undated) DEVICE — TUBE SET SMOKE EVAC PNEUMOCLEAR HUMIDIFIED

## (undated) DEVICE — CHLORAPREP HI-LITE 26ML ORANGE

## (undated) DEVICE — TOWEL SURG XR DETECT GREEN STRL RFD

## (undated) DEVICE — TISSUE RETRIEVAL SYSTEM: Brand: INZII RETRIEVAL SYSTEM

## (undated) DEVICE — INSUFFLATION NEEDLE TO ESTABLISH PNEUMOPERITONEUM.: Brand: INSUFFLATION NEEDLE

## (undated) DEVICE — TROCAR: Brand: KII® SLEEVE

## (undated) DEVICE — PDS II VLT 0 107CM AG ST3: Brand: ENDOLOOP

## (undated) DEVICE — GLOVE INDICATOR PI UNDERGLOVE SZ 7.5 BLUE

## (undated) DEVICE — TIBURON GENERAL ENDOSCOPY DRAPE: Brand: CONVERTORS

## (undated) DEVICE — SUT VICRYL 0 UR-6 27 IN J603H